# Patient Record
Sex: MALE | Race: WHITE | Employment: OTHER | URBAN - METROPOLITAN AREA
[De-identification: names, ages, dates, MRNs, and addresses within clinical notes are randomized per-mention and may not be internally consistent; named-entity substitution may affect disease eponyms.]

---

## 2017-01-01 ENCOUNTER — CARE COORDINATION (OUTPATIENT)
Dept: CARE COORDINATION | Facility: CLINIC | Age: 72
End: 2017-01-01

## 2017-01-01 ENCOUNTER — OFFICE VISIT (OUTPATIENT)
Dept: FAMILY MEDICINE | Facility: CLINIC | Age: 72
End: 2017-01-01
Payer: COMMERCIAL

## 2017-01-01 ENCOUNTER — MEDICAL CORRESPONDENCE (OUTPATIENT)
Dept: HEALTH INFORMATION MANAGEMENT | Facility: CLINIC | Age: 72
End: 2017-01-01

## 2017-01-01 ENCOUNTER — TELEPHONE (OUTPATIENT)
Dept: FAMILY MEDICINE | Facility: CLINIC | Age: 72
End: 2017-01-01

## 2017-01-01 ENCOUNTER — DOCUMENTATION ONLY (OUTPATIENT)
Dept: FAMILY MEDICINE | Facility: CLINIC | Age: 72
End: 2017-01-01

## 2017-01-01 ENCOUNTER — DOCUMENTATION ONLY (OUTPATIENT)
Dept: CARE COORDINATION | Facility: CLINIC | Age: 72
End: 2017-01-01

## 2017-01-01 ENCOUNTER — HOSPITAL ENCOUNTER (EMERGENCY)
Facility: CLINIC | Age: 72
End: 2017-01-01
Payer: MEDICARE

## 2017-01-01 ENCOUNTER — HOSPITAL ENCOUNTER (EMERGENCY)
Facility: CLINIC | Age: 72
Discharge: NURSING, REHAB OR LONG-TERM CARE | End: 2017-01-31
Attending: FAMILY MEDICINE | Admitting: FAMILY MEDICINE
Payer: MEDICARE

## 2017-01-01 ENCOUNTER — APPOINTMENT (OUTPATIENT)
Dept: CT IMAGING | Facility: CLINIC | Age: 72
End: 2017-01-01
Attending: FAMILY MEDICINE
Payer: MEDICARE

## 2017-01-01 VITALS
OXYGEN SATURATION: 96 % | HEART RATE: 71 BPM | RESPIRATION RATE: 20 BRPM | DIASTOLIC BLOOD PRESSURE: 77 MMHG | SYSTOLIC BLOOD PRESSURE: 176 MMHG | TEMPERATURE: 98.9 F

## 2017-01-01 VITALS
HEIGHT: 70 IN | TEMPERATURE: 96.5 F | OXYGEN SATURATION: 98 % | BODY MASS INDEX: 32.35 KG/M2 | HEART RATE: 59 BPM | RESPIRATION RATE: 18 BRPM | SYSTOLIC BLOOD PRESSURE: 132 MMHG | WEIGHT: 226 LBS | DIASTOLIC BLOOD PRESSURE: 70 MMHG

## 2017-01-01 VITALS
DIASTOLIC BLOOD PRESSURE: 72 MMHG | TEMPERATURE: 97.2 F | WEIGHT: 217 LBS | SYSTOLIC BLOOD PRESSURE: 144 MMHG | HEIGHT: 70 IN | BODY MASS INDEX: 31.07 KG/M2 | HEART RATE: 65 BPM | OXYGEN SATURATION: 96 % | RESPIRATION RATE: 18 BRPM

## 2017-01-01 DIAGNOSIS — H10.33 ACUTE CONJUNCTIVITIS OF BOTH EYES, UNSPECIFIED ACUTE CONJUNCTIVITIS TYPE: Primary | ICD-10-CM

## 2017-01-01 DIAGNOSIS — E11.3419 TYPE 2 DIABETES MELLITUS WITH SEVERE NONPROLIFERATIVE RETINOPATHY AND MACULAR EDEMA, WITHOUT LONG-TERM CURRENT USE OF INSULIN, UNSPECIFIED LATERALITY (H): ICD-10-CM

## 2017-01-01 DIAGNOSIS — E78.5 HYPERLIPIDEMIA LDL GOAL <100: ICD-10-CM

## 2017-01-01 DIAGNOSIS — F32.A DEPRESSION, UNSPECIFIED DEPRESSION TYPE: ICD-10-CM

## 2017-01-01 DIAGNOSIS — F03.91 DEMENTIA WITH BEHAVIORAL DISTURBANCE, UNSPECIFIED DEMENTIA TYPE: ICD-10-CM

## 2017-01-01 DIAGNOSIS — R41.0 DELIRIUM: Primary | ICD-10-CM

## 2017-01-01 DIAGNOSIS — F41.9 ANXIETY: Primary | ICD-10-CM

## 2017-01-01 DIAGNOSIS — E78.5 HYPERLIPIDEMIA LDL GOAL <100: Primary | ICD-10-CM

## 2017-01-01 DIAGNOSIS — R74.8 ELEVATED LIPASE: ICD-10-CM

## 2017-01-01 DIAGNOSIS — R21 RASH: ICD-10-CM

## 2017-01-01 DIAGNOSIS — F02.80 ALZHEIMER'S DEMENTIA (H): Primary | ICD-10-CM

## 2017-01-01 DIAGNOSIS — G30.9 ALZHEIMER'S DEMENTIA (H): Primary | ICD-10-CM

## 2017-01-01 DIAGNOSIS — Z86.73 H/O: STROKE: ICD-10-CM

## 2017-01-01 DIAGNOSIS — E11.3493 TYPE 2 DIABETES MELLITUS WITH SEVERE NONPROLIFERATIVE RETINOPATHY OF BOTH EYES, WITHOUT LONG-TERM CURRENT USE OF INSULIN, MACULAR EDEMA PRESENCE UNSPECIFIED (H): Primary | ICD-10-CM

## 2017-01-01 DIAGNOSIS — F03.90 DEMENTIA (H): ICD-10-CM

## 2017-01-01 LAB
ALBUMIN SERPL-MCNC: 3.9 G/DL (ref 3.4–5)
ALBUMIN UR-MCNC: 10 MG/DL
ALBUMIN UR-MCNC: NEGATIVE MG/DL
ALP SERPL-CCNC: 77 U/L (ref 40–150)
ALT SERPL W P-5'-P-CCNC: 23 U/L (ref 0–70)
ANION GAP SERPL CALCULATED.3IONS-SCNC: 8 MMOL/L (ref 3–14)
APPEARANCE UR: CLEAR
APPEARANCE UR: CLEAR
APTT PPP: 34 SEC (ref 22–37)
AST SERPL W P-5'-P-CCNC: 16 U/L (ref 0–45)
BASOPHILS # BLD AUTO: 0 10E9/L (ref 0–0.2)
BASOPHILS NFR BLD AUTO: 0.6 %
BILIRUB SERPL-MCNC: 0.5 MG/DL (ref 0.2–1.3)
BILIRUB UR QL STRIP: NEGATIVE
BILIRUB UR QL STRIP: NEGATIVE
BUN SERPL-MCNC: 20 MG/DL (ref 7–30)
CALCIUM SERPL-MCNC: 8.9 MG/DL (ref 8.5–10.1)
CHLORIDE SERPL-SCNC: 106 MMOL/L (ref 94–109)
CO2 SERPL-SCNC: 29 MMOL/L (ref 20–32)
COLOR UR AUTO: YELLOW
COLOR UR AUTO: YELLOW
CREAT SERPL-MCNC: 0.92 MG/DL (ref 0.66–1.25)
CREAT UR-MCNC: 122 MG/DL
DIFFERENTIAL METHOD BLD: ABNORMAL
EOSINOPHIL # BLD AUTO: 0.2 10E9/L (ref 0–0.7)
EOSINOPHIL NFR BLD AUTO: 3.3 %
ERYTHROCYTE [DISTWIDTH] IN BLOOD BY AUTOMATED COUNT: 12.7 % (ref 10–15)
GFR SERPL CREATININE-BSD FRML MDRD: 80 ML/MIN/1.7M2
GLUCOSE SERPL-MCNC: 150 MG/DL (ref 70–99)
GLUCOSE SERPL-MCNC: 94 MG/DL (ref 70–99)
GLUCOSE UR STRIP-MCNC: 500 MG/DL
GLUCOSE UR STRIP-MCNC: NEGATIVE MG/DL
HCT VFR BLD AUTO: 34.9 % (ref 40–53)
HGB BLD-MCNC: 12 G/DL (ref 13.3–17.7)
HGB UR QL STRIP: NEGATIVE
HGB UR QL STRIP: NEGATIVE
HYALINE CASTS #/AREA URNS LPF: 1 /LPF (ref 0–2)
IMM GRANULOCYTES # BLD: 0 10E9/L (ref 0–0.4)
IMM GRANULOCYTES NFR BLD: 0.3 %
INR PPP: 1.28 (ref 0.86–1.14)
KETONES UR STRIP-MCNC: 5 MG/DL
KETONES UR STRIP-MCNC: NEGATIVE MG/DL
LEUKOCYTE ESTERASE UR QL STRIP: NEGATIVE
LEUKOCYTE ESTERASE UR QL STRIP: NEGATIVE
LIPASE SERPL-CCNC: 889 U/L (ref 73–393)
LYMPHOCYTES # BLD AUTO: 1.1 10E9/L (ref 0.8–5.3)
LYMPHOCYTES NFR BLD AUTO: 17.6 %
MCH RBC QN AUTO: 30.1 PG (ref 26.5–33)
MCHC RBC AUTO-ENTMCNC: 34.4 G/DL (ref 31.5–36.5)
MCV RBC AUTO: 88 FL (ref 78–100)
MICROALBUMIN UR-MCNC: 49 MG/L
MICROALBUMIN/CREAT UR: 40.25 MG/G CR (ref 0–17)
MONOCYTES # BLD AUTO: 0.6 10E9/L (ref 0–1.3)
MONOCYTES NFR BLD AUTO: 9.3 %
MUCOUS THREADS #/AREA URNS LPF: PRESENT /LPF
NEUTROPHILS # BLD AUTO: 4.4 10E9/L (ref 1.6–8.3)
NEUTROPHILS NFR BLD AUTO: 68.9 %
NITRATE UR QL: NEGATIVE
NITRATE UR QL: NEGATIVE
NON-SQ EPI CELLS #/AREA URNS LPF: ABNORMAL /LPF
PH UR STRIP: 5.5 PH (ref 5–7)
PH UR STRIP: 5.5 PH (ref 5–7)
PLATELET # BLD AUTO: 158 10E9/L (ref 150–450)
POTASSIUM SERPL-SCNC: 4.5 MMOL/L (ref 3.4–5.3)
PROT SERPL-MCNC: 6.3 G/DL (ref 6.8–8.8)
RBC # BLD AUTO: 3.99 10E12/L (ref 4.4–5.9)
RBC #/AREA URNS AUTO: 1 /HPF (ref 0–2)
RBC #/AREA URNS AUTO: ABNORMAL /HPF (ref 0–2)
SODIUM SERPL-SCNC: 143 MMOL/L (ref 133–144)
SP GR UR STRIP: 1.02 (ref 1–1.03)
SP GR UR STRIP: 1.02 (ref 1–1.03)
SQUAMOUS #/AREA URNS AUTO: 1 /HPF (ref 0–1)
TSH SERPL DL<=0.005 MIU/L-ACNC: 0.89 MU/L (ref 0.4–4)
URN SPEC COLLECT METH UR: ABNORMAL
URN SPEC COLLECT METH UR: ABNORMAL
UROBILINOGEN UR STRIP-ACNC: 2 EU/DL (ref 0.2–1)
UROBILINOGEN UR STRIP-MCNC: 2 MG/DL (ref 0–2)
WBC # BLD AUTO: 6.4 10E9/L (ref 4–11)
WBC #/AREA URNS AUTO: 1 /HPF (ref 0–2)
WBC #/AREA URNS AUTO: ABNORMAL /HPF (ref 0–2)

## 2017-01-01 PROCEDURE — 85730 THROMBOPLASTIN TIME PARTIAL: CPT | Performed by: FAMILY MEDICINE

## 2017-01-01 PROCEDURE — 93005 ELECTROCARDIOGRAM TRACING: CPT

## 2017-01-01 PROCEDURE — 81001 URINALYSIS AUTO W/SCOPE: CPT | Performed by: FAMILY MEDICINE

## 2017-01-01 PROCEDURE — 25000125 ZZHC RX 250: Performed by: FAMILY MEDICINE

## 2017-01-01 PROCEDURE — 70450 CT HEAD/BRAIN W/O DYE: CPT

## 2017-01-01 PROCEDURE — 82947 ASSAY GLUCOSE BLOOD QUANT: CPT | Performed by: FAMILY MEDICINE

## 2017-01-01 PROCEDURE — 80053 COMPREHEN METABOLIC PANEL: CPT | Performed by: FAMILY MEDICINE

## 2017-01-01 PROCEDURE — 99285 EMERGENCY DEPT VISIT HI MDM: CPT | Mod: 25 | Performed by: FAMILY MEDICINE

## 2017-01-01 PROCEDURE — 36415 COLL VENOUS BLD VENIPUNCTURE: CPT | Performed by: FAMILY MEDICINE

## 2017-01-01 PROCEDURE — 25000132 ZZH RX MED GY IP 250 OP 250 PS 637: Mod: GY | Performed by: FAMILY MEDICINE

## 2017-01-01 PROCEDURE — 82043 UR ALBUMIN QUANTITATIVE: CPT | Performed by: FAMILY MEDICINE

## 2017-01-01 PROCEDURE — 93010 ELECTROCARDIOGRAM REPORT: CPT | Performed by: FAMILY MEDICINE

## 2017-01-01 PROCEDURE — 84443 ASSAY THYROID STIM HORMONE: CPT | Performed by: FAMILY MEDICINE

## 2017-01-01 PROCEDURE — 85610 PROTHROMBIN TIME: CPT | Performed by: FAMILY MEDICINE

## 2017-01-01 PROCEDURE — 85025 COMPLETE CBC W/AUTO DIFF WBC: CPT | Performed by: FAMILY MEDICINE

## 2017-01-01 PROCEDURE — 99214 OFFICE O/P EST MOD 30 MIN: CPT | Performed by: FAMILY MEDICINE

## 2017-01-01 PROCEDURE — 99285 EMERGENCY DEPT VISIT HI MDM: CPT | Mod: 25

## 2017-01-01 PROCEDURE — 96372 THER/PROPH/DIAG INJ SC/IM: CPT

## 2017-01-01 PROCEDURE — 99213 OFFICE O/P EST LOW 20 MIN: CPT | Performed by: FAMILY MEDICINE

## 2017-01-01 PROCEDURE — 83690 ASSAY OF LIPASE: CPT | Performed by: FAMILY MEDICINE

## 2017-01-01 PROCEDURE — A9270 NON-COVERED ITEM OR SERVICE: HCPCS | Mod: GY | Performed by: FAMILY MEDICINE

## 2017-01-01 RX ORDER — ATORVASTATIN CALCIUM 40 MG/1
40 TABLET, FILM COATED ORAL DAILY
Qty: 90 TABLET | Refills: 0 | Status: SHIPPED | OUTPATIENT
Start: 2017-01-01

## 2017-01-01 RX ORDER — RIVASTIGMINE TARTRATE 3 MG/1
3 CAPSULE ORAL 2 TIMES DAILY
Qty: 60 CAPSULE | Refills: 1 | Status: SHIPPED | OUTPATIENT
Start: 2017-01-01

## 2017-01-01 RX ORDER — OLANZAPINE 10 MG/2ML
5 INJECTION, POWDER, FOR SOLUTION INTRAMUSCULAR DAILY PRN
Status: DISCONTINUED | OUTPATIENT
Start: 2017-01-01 | End: 2017-01-01 | Stop reason: HOSPADM

## 2017-01-01 RX ORDER — MUPIROCIN 20 MG/G
OINTMENT TOPICAL 3 TIMES DAILY
Qty: 22 G | Refills: 1 | Status: SHIPPED | OUTPATIENT
Start: 2017-01-01 | End: 2017-01-01 | Stop reason: ALTCHOICE

## 2017-01-01 RX ORDER — MUPIROCIN 20 MG/G
OINTMENT TOPICAL 3 TIMES DAILY
Qty: 30 G | Refills: 1 | Status: SHIPPED | OUTPATIENT
Start: 2017-01-01 | End: 2017-01-01

## 2017-01-01 RX ORDER — HALOPERIDOL 1 MG/1
1 TABLET ORAL 3 TIMES DAILY PRN
Qty: 60 TABLET | Refills: 0 | Status: SHIPPED | OUTPATIENT
Start: 2017-01-01

## 2017-01-01 RX ORDER — HALOPERIDOL 5 MG/1
5 TABLET ORAL EVERY 6 HOURS PRN
Status: DISCONTINUED | OUTPATIENT
Start: 2017-01-01 | End: 2017-01-01 | Stop reason: HOSPADM

## 2017-01-01 RX ADMIN — HALOPERIDOL 5 MG: 5 TABLET ORAL at 19:59

## 2017-01-01 RX ADMIN — OLANZAPINE 5 MG: 10 INJECTION, POWDER, FOR SOLUTION INTRAMUSCULAR at 20:52

## 2017-01-01 ASSESSMENT — ENCOUNTER SYMPTOMS
RESPIRATORY NEGATIVE: 1
ENDOCRINE NEGATIVE: 1
GASTROINTESTINAL NEGATIVE: 1
HEMATOLOGIC/LYMPHATIC NEGATIVE: 1
EYES NEGATIVE: 1
PSYCHIATRIC NEGATIVE: 1
ALLERGIC/IMMUNOLOGIC NEGATIVE: 1
NEUROLOGICAL NEGATIVE: 1
CONSTITUTIONAL NEGATIVE: 1
MUSCULOSKELETAL NEGATIVE: 1
CARDIOVASCULAR NEGATIVE: 1
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
## 2017-01-03 NOTE — PROGRESS NOTES
Clinic Care Coordination Contact   Patient's spouse left a message on RN CC VM stating she is needing some advice regarding patient so she is requesting a call back.     RN CC to call patient back.           Cari Rasheed RN, [facility]   RN Care Coordinator   [facility]   Phone # [phone]   1/3/2017 9:12 AM

## 2017-01-03 NOTE — PROGRESS NOTES
Clinic Care Coordination Contact   Care Team Conversations   RN CC spoke with Shi CAPPS regarding the case. She suggested getting patient in for an appointment for possible medication adjustments and maybe something at night for helping him sleep and rule out any type of infection. She also suggested getting a Long Term Care Consultation thru the county to see what services they would qualify for. She said another option as well is to get home care to come out for nursing assessment for medication changes and undo stress of removal out of home. She also suggested making sure all guns and weapons are removed from the home.   Cari Rasheed RN, [facility]   RN Care Coordinator   [facility]   Phone # [phone]   1/3/2017 10:25 AM

## 2017-01-03 NOTE — PROGRESS NOTES
Clinic Care Coordination Contact  Care Team Conversations    RN CC spoke with patient's wife regarding the plan, she is ok with this plan. RN CC assisted patient's wife in making an appointment with PCP for patient on Thursday 1/5/17 @ 1pm to rule out infection and medication adjustments. DILIP CAPPS also will call [facility] for Long Term Care Consultation and have them set up appointment with patient's spouse via her cell phone.    If at OV with PCP on Thursday, we can always order home care for new medication and medication adjustments and he is home bound due to great difficulty getting him out of the house due to his paranoia.    Cari Rasheed RN, [facility]  RN Care Coordinator  [facility]  Phone # [phone]  1/3/2017 10:39 AM

## 2017-01-04 NOTE — TELEPHONE ENCOUNTER
Atorvastatin         Last Written Prescription Date: 10/28/15  Last Fill Quantity: 90, # refills: 3    Last Office Visit with [facility], [facility] or [facility] prescribing provider:  11/30/16   Future Office Visit:  0  Next 5 appointments (look out 90 days)     Jan 05, 2017 10:20 AM   SHORT with Ramakrishna Elizondo MD   [facility]    [address] [place] [id number]   [phone]                  BP Readings from Last 3 Encounters:   11/30/16 138/88   06/15/16 120/64   05/05/16 134/66     ALT       30   4/28/2014  CHOL      157   10/28/2015  HDL       38   10/28/2015  LDL       85   10/28/2015  LDL      105   9/8/2014  TRIG      168   10/28/2015  CHOLHDLRATIO      4.1   10/28/2015

## 2017-01-04 NOTE — PROGRESS NOTES
Clinic Care Coordination Contact  Care Team Conversations    Patient's spouse Pat called RN CC stating she has rescheduled patient's appointment for 10:30am tomorrow with Dr. Elizondo for a second opinion and daughter, spouse will be there. She also hopes that RN CC will attend. RN CC informed her she will be there and give  an heads up as well. She appreciated this.    Cari Rasheed RN, [facility]  RN Care Coordinator  [facility]  Phone # [phone]  1/4/2017 9:32 AM

## 2017-01-05 NOTE — PROGRESS NOTES
Clinic Care Coordination Contact  OUTREACH    Referral Information:  Referral Source: PCP  Reason for Contact: Face to face visit with Dr. Elizondo today to address behavioral issues. Patient saw Dr. Elizondo with son. While RN CC met with spouse Judi and daughter Elizabeth. They arrived late today due to meeting with Stephanie Matias with [facility] for respite, companionship and filling out health care directives. RN CC made copies of the healthcare directives for chart. Daughter Elizabeth informed RN GÉNESIS that she has put her dad on the waiting list for [facility]. Pat states its so expensive, RN CC explained to her that is why we are doing the long term care consultation to help sort out his and her assets so he can get the help he needs. We discussed at length that should things get bad where he is dangerous or she feels unsafe then she needs to call 911. We discussed when its appropriate to bring in hospice as well and will keep it in the back of our minds.     DILIP CAPPS did have Dr. Elizondo come in to talk with wife Pat and daughter Elizabeth to discuss their concerns separate from patient as patient was seen with son. They talked about Pat recently starting patient on the Rivastigmine about a week ago then two days ago started him on the 3mg and states she has seen a difference, he agreed to keep him on this 3mg for a month and see what happens. We talked about making the neurology consult as well and having a UA with Microscopic done, she was given a cup and instructions to bring it back when done. They also are to make a one month follow up with Dr. Elizondo which was done for 2/2/17. We also discussed in detail that patient is more in the moderate to severe part of the disease and that he is going to progress in which he will need 24/7 care in more a facility setting, both Elizabeth and Judi verbalized understanding of this.        Universal Utilization:   Last PCP appointment: 01/05/17             Clinical  Concerns:  Current Medical Concerns: See above    Current Behavioral Concerns: see above    Education Provided to patient: see above   Clinical Pathway Name: None  Clinical Pathway: None    Medication Management:  Wife Pat administers the medications.     Functional Status:  Mobility Status: Independent  Equipment Currently Used at Home: none  Transportation: family           Psychosocial:  Current living arrangement:: I live in a private home with spouse  Financial/Insurance: Medicare/BCBS  The plan is to work on doing some spend down with their funding like new furnace, new carpet.     Resources and Interventions:  Current Resources: n/a        Advanced Care Plans/Directives on file:: Yes  Referrals Placed: [facility]     Goals:   n/a              Barriers: Progressive dementia  Strengths: great family support and willingness to work with care coordination  Patient/Caregiver understanding: yes  Frequency of Care Coordination: PRN  Upcoming appointment: 02/02/17     Plan:   Patient will continue to follow treatment plan as directed and follow up with PCP with concerns ongoing.   Pat to schedule neurology consult.  Pat to schedule f/u with Dr. Elizondo.  Pat to get urine sample for testing.  RN CC to remain available.    Cari Rasheed RN, [facility]  RN Care Coordinator  [facility]  Phone # [phone]  1/5/2017 2:06 PM

## 2017-01-05 NOTE — NURSING NOTE
"Chief Complaint   Patient presents with     Dementia     Derm Problem       Initial /72 mmHg  Pulse 65  Temp(Src) 97.2  F (36.2  C) (Tympanic)  Resp 18  Ht 5' 10.25\" (1.784 m)  Wt 217 lb (98.431 kg)  BMI 30.93 kg/m2  SpO2 96% Estimated body mass index is 30.93 kg/(m^2) as calculated from the following:    Height as of this encounter: 5' 10.25\" (1.784 m).    Weight as of this encounter: 217 lb (98.431 kg).  BP completed using cuff size: regular  "

## 2017-01-05 NOTE — PATIENT INSTRUCTIONS
Would recommend minimal pharmacological intervention.  Suggested regular walk, read books and activities which you enjoy.   Avoid triggers which may precipitate delirium.   Mupirocin ordered for rash.   Follow up with neurology as advised in the past.

## 2017-01-05 NOTE — PROGRESS NOTES
SUBJECTIVE:                                                    Maik Case is a 71 year old male who presents to clinic today for the following health issues:      Dementia       Duration: Ongoing- getting a lot worse lately.  Lots of hallucinations, delusions    Description (location/character/radiation): Getting mad about a drivers license and not being able to drive.     Intensity:  moderate    Accompanying signs and symptoms: Sores on his face for about a month. - mostly on the left side-has one on the right. Not itchy- using neosporin.     History (similar episodes/previous evaluation): Thought it was medication reaction so switched meds and the sores are still there.     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 He is having gradual decline in memory for several years which has significantly worsened in the recent past. He was started on aricept but stopped secondary to facial rash and was switched to rivastigmine. Wife started giving him rivastigmine few days ago only (son mentioned previously that was stared about 1 month ago). No other relevant systemic symptoms. MRI brain in 2014 showed diffuse cerebral volume loss and cerebral white matter changes consistent with chronic small vessel ischemic disease.      Problem list and histories reviewed & adjusted, as indicated.  Additional history: as documented    Patient Active Problem List   Diagnosis     Hypertension goal BP (blood pressure) < 140/90     Impotence of organic origin     Obesity     bilateral knee djd     GERD (gastroesophageal reflux disease)     Diabetic macular edema (H)     Hyperlipidemia LDL goal <100     Type 2 diabetes mellitus with severe nonproliferative  retinopathy and macular edema     Benign localized hyperplasia of prostate with urinary obstruction and other lower urinary tract symptoms (LUTS)(600.21)     Status post lumbar surgery     H/O: CVA (cerebrovascular accident)     Lupus anticoagulant positive     BPV (benign positional vertigo)     Dementia without behavioral disturbance, unspecified dementia type     Anxiety     Past Surgical History   Procedure Laterality Date     Surgical history of -        s/p (R) Knee     Surgical history of -        s/p Hernia     Surgical history of -        s/p Tonsillectomy as a child     Surgical history of -   2003     s/p (R) Knee       Social History   Substance Use Topics     Smoking status: Former Smoker     Quit date: 1980     Smokeless tobacco: Never Used     Alcohol Use: Yes      Comment: yearly one beer four times a year     Family History   Problem Relation Age of Onset     Alzheimer Disease Mother      on med     C.A.D. Father       unknown, last seen him age 18     Eye Disorder Brother      Retinitis pigmentosis, cataracts     Hypertension Daughter          Current Outpatient Prescriptions   Medication Sig Dispense Refill     mupirocin (BACTROBAN) 2 % ointment Apply topically 3 times daily for 7 days 30 g 1     atorvastatin (LIPITOR) 40 MG tablet Take 1 tablet (40 mg) by mouth daily 90 tablet 0     metFORMIN (GLUCOPHAGE) 1000 MG tablet Take 1 tablet (1,000 mg) by mouth 2 times daily (with meals) 180 tablet 3     lisinopril (PRINIVIL,ZESTRIL) 20 MG tablet 20mg tab bid 180 tablet 1     sertraline (ZOLOFT) 100 MG tablet Take 1 tablet (100 mg) by mouth daily 90 tablet 3     calcium carbonate (OS- MG [place]. CA) 500 MG tablet Take 500 mg by mouth 2 times daily       Cholecalciferol (VITAMIN D3 PO) Take 2,000 Units by mouth daily       Naproxen Sodium (ALEVE PO)        aspirin 325 MG tablet Take 325 mg by mouth every evening       glucose blood VI test strips (ACCU-CHEK COMPACT DRUM TEST STRIPS)  strip Test as directed 3 times daily 3 Box 3     ORDER FOR DME Equipment being ordered: one pair of diabetic shoes 1 Device 0     Cyanocobalamin (VITAMIN B 12 PO) Take 500 mcg by mouth daily        Ketorolac Tromethamine, [id number], (KETOROLAC TROMETHAMINE OP) Place 1 drop Into the left eye 4 times daily.       UNKNOWN MED DOSAGE Steroid Shot in eye every 6 - 8 weeks (Dr. Gould)       MULTIVITAMIN OR take one tablet by mouth daily       GLUCOSAMINE CHONDROITIN OR TABS 1 tablet twice daily - pt states there are other vitamins/minerals in tablet       Allergies   Allergen Reactions     Aricept [Donepezil] Blisters     On face     Penicillins Swelling     Percocet [Oxycodone-Acetaminophen] Itching     Vicodin [Hydrocodone-Acetaminophen] Itching     Recent Labs   Lab Test  11/30/16   1543  05/05/16   0924  10/28/15   0957  09/08/14   0845  04/28/14   1145   06/26/13   0850   06/20/13   1044  11/14/12   0840   06/14/11   0905   A1C  6.5*  7.0*  8.1*  6.5*   --    < >   --    --   7.5*  6.6*   < >   --    LDL   --    --   85  105   --    --    --    --   115  106   --   73   HDL   --    --   38*   --    --    --    --    --    --   37*   --   42   TRIG   --    --   168*   --    --    --    --    --    --   61   --   45   ALT   --    --    --    --   30   --   30   --    --   31   --   17   CR  0.87  0.91   --    --   0.77   < >  0.88   < >   --   1.05   < >   --    GFRESTIMATED  86  81   --    --   >90   < >  86   < >   --   70   < >   --    GFRESTBLACK  >90   GFR Calc    >90   GFR Calc     --    --   >90   < >  >90   < >   --   85   < >   --    POTASSIUM  4.5  4.8   --    --   4.3   < >  4.5   < >   --   5.3   < >   --    TSH   --    --   1.45   --    --    --    --    --   1.80   --    --    --     < > = values in this interval not displayed.      BP Readings from Last 3 Encounters:   01/05/17 144/72   11/30/16 138/88   06/15/16 120/64    Wt Readings from Last 3 Encounters:   01/05/17  "217 lb (98.431 kg)   11/30/16 217 lb (98.431 kg)   06/15/16 224 lb (101.606 kg)                  Problem list, Medication list, Allergies, and Medical/Social/Surgical histories reviewed in [facility] and updated as appropriate.    ROS:  Constitutional, HEENT, cardiovascular, pulmonary, gi and gu systems are negative, except as otherwise noted.    OBJECTIVE:                                                    /72 mmHg  Pulse 65  Temp(Src) 97.2  F (36.2  C) (Tympanic)  Resp 18  Ht 5' 10.25\" (1.784 m)  Wt 217 lb (98.431 kg)  BMI 30.93 kg/m2  SpO2 96%  Body mass index is 30.93 kg/(m^2).  GENERAL: alert and no distress  EYES: Eyes grossly normal to inspection, PERRL and conjunctivae and sclerae normal  HENT: ear canals and TM's normal, nose and mouth without ulcers or lesions  NECK: no adenopathy, no asymmetry, masses, or scars and thyroid normal to palpation  RESP: lungs clear to auscultation - no rales, rhonchi or wheezes  CV: regular rate and rhythm, normal S1 S2, no S3 or S4, no murmur, click or rub, no peripheral edema and peripheral pulses strong  ABDOMEN: soft, nontender, no hepatosplenomegaly, no masses and bowel sounds normal  MS: no gross musculoskeletal defects noted, no edema  SKIN: erythematous papular lesions involving face along with some excoriation and honey colored crusting, no blistering or drainage noted  NEURO: Normal strength and tone, sensory exam grossly normal, short-term memory loss, pretty obvious, repetitive questioning, good eye contact, poor insight and judgment, no focal neurology     ASSESSMENT/PLAN:                                                          ICD-10-CM    1. Dementia with behavioral disturbance, unspecified dementia type F03.91    2. Rash R21 mupirocin (BACTROBAN) 2 % ointment     DISCONTINUED: mupirocin (BACTROBAN) 2 % ointment   3. Type 2 diabetes mellitus with severe nonproliferative retinopathy and macular edema, without long-term current use of insulin, unspecified " laterality E11.3419    4. H/O: stroke Z86.73    5. Depression, unspecified depression type F32.9        71 year-old male brought in by family for evaluation of facial rash and declining memory along with delusions and hallucinations. Symptoms are likely secondary to dementia (alzheimer vs vascular). Started taking rivastigmine few days ago. Facial rash likely due to continuous picking and impetigo. Mupirocin ointment prescribed. I explained family that unfortunately dementia is progressive disease with no cure along and with limited treatment options. Suggested to continue rivastigmine 3 mg. Suggested to avoid triggering factors like constipation, pain, minimizing unfamiliar situations/surroundings etc. Suspect symptoms related to delirium on the background of dementia. UA ordered to rule out UTI. Neurology consult placed for further review and recommendation, may consider CT had to rule out epidural or subdural bleed. Other medication reviewed and no changes made. Health care coordinator on board, following community referral and medical care. Follow up one month or earlier if needed. All questions answered.      Ramakrishna Elizondo MD  [facility]

## 2017-01-05 NOTE — MR AVS SNAPSHOT
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  Would recommend minimal pharmacological intervention.  Suggested regular walk, read books and activities which you enjoy.   Avoid triggers which may precipitate delirium.   Mupirocin ordered for rash.   Follow up with neurology as advised in the past.            Follow-ups after your visit        Future tests that were ordered for you today     Open Future Orders        Priority Expected Expires Ordered    Lipid Profile with reflex to direct LDL Routine  1/4/2018 1/4/2017    Albumin Random Urine Quantitative Routine  1/4/2018 1/4/2017            Who to contact     If you have questions or need follow up information about today's clinic visit or your schedule please contact [facility] directly at [phone].  Normal or non-critical lab and imaging results will be communicated to you by MyChart, letter or phone within 4 business days after the clinic has received the results. If you do not hear from us within 7 days, please contact the clinic through MyChart or phone. If you have a critical or abnormal lab result, we will notify you by phone as soon as possible.  Submit refill requests through [facility] or call your pharmacy and they will forward the refill request to us. Please allow 3 business days for  "your refill to be completed.          Additional Information About Your Visit        [employer] Information     [employer] gives you secure access to your electronic health record. If you see a primary care provider, you can also send messages to your care team and make appointments. If you have questions, please call your primary care clinic.  If you do not have a primary care provider, please call [phone] and they will assist you.        Care EveryWhere ID     This is your Care EveryWhere ID. This could be used by other organizations to access your [place] medical records  [id number]        Your Vitals Were     Pulse Temperature Respirations Height BMI (Body Mass Index) Pulse Oximetry    65 97.2  F (36.2  C) (Tympanic) 18 5' 10.25\" (1.784 m) 30.93 kg/m2 96%       Blood Pressure from Last 3 Encounters:   01/05/17 144/72   11/30/16 138/88   06/15/16 120/64    Weight from Last 3 Encounters:   01/05/17 217 lb (98.431 kg)   11/30/16 217 lb (98.431 kg)   06/15/16 224 lb (101.606 kg)              Today, you had the following     No orders found for display         Today's Medication Changes          These changes are accurate as of: 1/5/17 12:07 PM.  If you have any questions, ask your nurse or doctor.               Start taking these medicines.        Dose/Directions    mupirocin 2 % ointment   Commonly known as:  BACTROBAN   Used for:  Rash   Started by:  Ramakrishna Elizondo MD        Apply topically 3 times daily for 7 days   Quantity:  30 g   Refills:  1         Stop taking these medicines if you haven't already. Please contact your care team if you have questions.     rivastigmine 1.5 MG capsule   Commonly known as:  EXELON   Stopped by:  Ramakrishna Elizondo MD                Where to get your medicines      These medications were sent to [facility] [address] [place] [address]  [address] [facility] [address]     Phone:  [phone]    - mupirocin 2 % ointment             Primary Care Provider " Office Phone # Fax #    Behzad Mullins [phone]       [facility] [address] [facility] [address]        Thank you!     Thank you for choosing [facility]  for your care. Our goal is always to provide you with excellent care. Hearing back from our patients is one way we can continue to improve our services. Please take a few minutes to complete the written survey that you may receive in the mail after your visit with us. Thank you!             Your Updated Medication List - Protect others around you: Learn how to safely use, store and throw away your medicines at www.disposemymeds.org.          This list is accurate as of: 1/5/17 12:07 PM.  Always use your most recent med list.                   Brand Name Dispense Instructions for use    ALEVE PO          aspirin 325 MG tablet      Take 325 mg by mouth every evening       atorvastatin 40 MG tablet    LIPITOR    90 tablet    Take 1 tablet (40 mg) by mouth daily       blood glucose monitoring test strip    ACCU-CHEK COMPACT DRUM    3 Box    Test as directed 3 times daily       calcium carbonate 500 MG tablet    OS- mg [place]. [place]     Take 500 mg by mouth 2 times daily       GLUCOSAMINE CHONDROITIN Tabs      1 tablet twice daily - pt states there are other vitamins/minerals in tablet       KETOROLAC TROMETHAMINE OP      Place 1 drop Into the left eye 4 times daily.       lisinopril 20 MG tablet    PRINIVIL/ZESTRIL    180 tablet    20mg tab bid       metFORMIN 1000 MG tablet    GLUCOPHAGE    180 tablet    Take 1 tablet (1,000 mg) by mouth 2 times daily (with meals)       MULTIVITAMIN PO      take one tablet by mouth daily       mupirocin 2 % ointment    BACTROBAN    30 g    Apply topically 3 times daily for 7 days       * order for DME     1 Device    Equipment being ordered: one pair of diabetic shoes       sertraline 100 MG tablet    ZOLOFT    90 tablet    Take 1 tablet (100 mg) by mouth daily       *  UNKNOWN MED DOSAGE      Steroid Shot in eye every 6 - 8 weeks (Dr. Gould)       VITAMIN B 12 PO      Take 500 mcg by mouth daily       VITAMIN D3 PO      Take 2,000 Units by mouth daily       * Notice:  This list has 2 medication(s) that are the same as other medications prescribed for you. Read the directions carefully, and ask your doctor or other care provider to review them with you.

## 2017-01-09 PROBLEM — F32.A DEPRESSION, UNSPECIFIED DEPRESSION TYPE: Status: ACTIVE | Noted: 2017-01-01

## 2017-01-20 NOTE — PROGRESS NOTES
Clinic Care Coordination Contact  [facility]/Voicemail    Referral Source: PCP    Clinical Data: Care Coordinator Outreach, update to see how things are going and if going to have blood work.    Outreach attempted x 1 to patient's wife.  No answer, voicemail box full.    Plan: Care Coordinator will try to reach patient again in 1-2 business days.      Cari Rasheed RN, Health system  RN Care Coordinator  [facility], [facility]  Phone # [phone]  1/20/2017 2:06 PM

## 2017-01-20 NOTE — PROGRESS NOTES
Clinic Care Coordination Contact    RN CC spoke with patient's daughter Elizabeth since RN CC was unable to reach spouse. Elizabeth states patient and spouse are staying at her brother's place right now. She states that Ryan Martinez from [facility] came out did his assessment and is working getting her respite care, PCA services, working on things in the home as well. She states patient qualified for Elderly Waiver.    Cari Rasheed RN, [facility]  RN Care Coordinator  [facility], [facility]  Phone # [phone]  1/20/2017 2:36 PM

## 2017-01-20 NOTE — PROGRESS NOTES
Clinic Care Coordination Contact    Patient's spouse left RN CC  stating she is aware of getting the blood work done but states its difficult to get him to come in. She states the day she got the urine sample he had three cups of coffee with about 1/4 cup of regular coffee creamer, she states in the message that usually he has sugar free but they are staying at her son's house right now. She states Ryan Martinez from the [facility] is working on getting them services.    RN CC to call Pat back.    Cari Rasheed RN, [facility]  RN Care Coordinator  [facility]  Phone # [phone]  1/20/2017 2:39 PM

## 2017-01-20 NOTE — PROGRESS NOTES
Clinic Care Coordination Contact    RN CC outreached to patient's spouse Pat, she reiterated the same thing she had said on the voicemail about the coffee and difficulty bringing him in to the clinic. RN CC agreed to ask PCP if its still necessary to get this done as its difficult? She states that Ryan from the [facility] has been great and helping her get services in place before going back home. She also asked RN CC to ask PCP about getting something for him to sleep and mood alterting, she states his mood has gotten bad again even with taking the Excelon 3mg daily. RN CC agreed to ask.    Plan:  PCP- spouse wants to know if she really needs to have patient brought in the clinic for lab as its difficult, she states his blood sugar was high in his urine due to 3 cups of coffee with 1/4 cup of regular creamer.  PCP-spouse also wants to know if patient can have something for sleep and mood alterting due to his behaviors are getting bad again.  RN CC to check in two weeks.    Cari Rasheed RN, [facility]  RN Care Coordinator  [facility], [facility]  Phone # [phone]  1/20/2017 2:56 PM

## 2017-01-23 NOTE — PROGRESS NOTES
SUBJECTIVE:                                                    Maik Case is a 71 year old male who presents to clinic today for the following health issues:      Eye(s) Problem      Duration: couple weeks    Description:  Location: bilateral  Pain: no pain but itchy  Redness: YES  Discharge: YES- not constant but every now and again    Accompanying signs and symptoms: eye lids are red and puffy    History (Trauma, foreign body exposure,): None    Precipitating or alleviating factors (contact use): None    Therapies tried and outcome: eye stain a few times          Problem list and histories reviewed & adjusted, as indicated.  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     Benign localized hyperplasia of prostate with urinary obstruction and other lower urinary tract symptoms (LUTS)(600.21)     Status post lumbar surgery     H/O: CVA (cerebrovascular accident)     Lupus anticoagulant positive     BPV (benign positional vertigo)     Dementia without behavioral disturbance, unspecified dementia type     Anxiety     Depression, unspecified depression type     Past Surgical History   Procedure Laterality Date     Surgical history of -   1981     s/p (R) Knee     Surgical history of -        s/p Hernia     Surgical history of -        s/p Tonsillectomy as a child     Surgical history of -   05/2003     s/p (R) Knee       Social History   Substance Use Topics     Smoking status: Former Smoker     Quit date: 08/03/1980     Smokeless tobacco: Never Used     Alcohol Use: Yes      Comment: yearly one beer four times a year     Family History   Problem Relation Age of Onset      Alzheimer Disease Mother      on med     C.A.D. Father       unknown, last seen him age 18     Eye Disorder Brother      Retinitis pigmentosis, cataracts     Hypertension Daughter          Current Outpatient Prescriptions   Medication Sig Dispense Refill     gentamicin (GARAMYCIN) 0.3 % ophthalmic ointment Place 1 Application into both eyes 3 times daily 3.5 g 0     rivastigmine (EXELON) 3 MG capsule Take 1 capsule (3 mg) by mouth 2 times daily 60 capsule 1     atorvastatin (LIPITOR) 40 MG tablet Take 1 tablet (40 mg) by mouth daily 90 tablet 0     metFORMIN (GLUCOPHAGE) 1000 MG tablet Take 1 tablet (1,000 mg) by mouth 2 times daily (with meals) 180 tablet 3     lisinopril (PRINIVIL,ZESTRIL) 20 MG tablet 20mg tab bid 180 tablet 1     sertraline (ZOLOFT) 100 MG tablet Take 1 tablet (100 mg) by mouth daily 90 tablet 3     calcium carbonate (OS- MG [place]. [place]) 500 MG tablet Take 500 mg by mouth 2 times daily       Cholecalciferol (VITAMIN D3 PO) Take 2,000 Units by mouth daily       Naproxen Sodium (ALEVE PO)        aspirin 325 MG tablet Take 325 mg by mouth every evening       glucose blood VI test strips (ACCU-CHEK COMPACT DRUM TEST STRIPS) strip Test as directed 3 times daily 3 Box 3     ORDER FOR DME Equipment being ordered: one pair of diabetic shoes 1 Device 0     Cyanocobalamin (VITAMIN B 12 PO) Take 500 mcg by mouth daily        UNKNOWN MED DOSAGE Steroid Shot in eye every 6 - 8 weeks (Dr. Gould)       MULTIVITAMIN OR take one tablet by mouth daily       GLUCOSAMINE CHONDROITIN OR TABS 1 tablet twice daily - pt states there are other vitamins/minerals in tablet       Allergies   Allergen Reactions     Aricept [Donepezil] Blisters     On face     Penicillins Swelling     Percocet [Oxycodone-Acetaminophen] Itching     Vicodin [Hydrocodone-Acetaminophen] Itching     Recent Labs   Lab Test  16   1543  16   0924  10/28/15   0957  14   0845  14   1145   13   0850    "06/20/13   1044  11/14/12   0840   06/14/11   0905   A1C  6.5*  7.0*  8.1*  6.5*   --    < >   --    --   7.5*  6.6*   < >   --    LDL   --    --   85  105   --    --    --    --   115  106   --   73   HDL   --    --   38*   --    --    --    --    --    --   37*   --   42   TRIG   --    --   168*   --    --    --    --    --    --   61   --   45   ALT   --    --    --    --   30   --   30   --    --   31   --   17   CR  0.87  0.91   --    --   0.77   < >  0.88   < >   --   1.05   < >   --    GFRESTIMATED  86  81   --    --   >90   < >  86   < >   --   70   < >   --    GFRESTBLACK  >90   GFR Calc    >90   GFR Calc     --    --   >90   < >  >90   < >   --   85   < >   --    POTASSIUM  4.5  4.8   --    --   4.3   < >  4.5   < >   --   5.3   < >   --    TSH   --    --   1.45   --    --    --    --    --   1.80   --    --    --     < > = values in this interval not displayed.      BP Readings from Last 3 Encounters:   01/23/17 132/70   01/05/17 144/72   11/30/16 138/88    Wt Readings from Last 3 Encounters:   01/23/17 226 lb (102.513 kg)   01/05/17 217 lb (98.431 kg)   11/30/16 217 lb (98.431 kg)                  Labs reviewed in EPIC  Problem list, Medication list, Allergies, and Medical/Social/Surgical histories reviewed in [facility] and updated as appropriate.    ROS:  Constitutional, HEENT, cardiovascular, pulmonary, gi and gu systems are negative, except as otherwise noted.    OBJECTIVE:                                                    /70 mmHg  Pulse 59  Temp(Src) 96.5  F (35.8  C) (Tympanic)  Resp 18  Ht 5' 10.25\" (1.784 m)  Wt 226 lb (102.513 kg)  BMI 32.21 kg/m2  SpO2 98%  Body mass index is 32.21 kg/(m^2).  GENERAL: alert and no distress  EYES: Eyelids slightly puffy, some watering noted, subconjunctival left eye, no significant conjunctival injection noted  NECK: no adenopathy, no asymmetry, masses, or scars and thyroid normal to palpation  RESP: lungs clear to " auscultation - no rales, rhonchi or wheezes  CV: regular rate and rhythm, normal S1 S2, no S3 or S4, no murmur, click or rub, no peripheral edema and peripheral pulses strong  NEURO: grossly intact  RASH: facial rashes appears improved looking       ASSESSMENT/PLAN:                                                          ICD-10-CM    1. Acute conjunctivitis of both eyes, unspecified acute conjunctivitis type H10.33 gentamicin (GARAMYCIN) 0.3 % ophthalmic ointment   2. 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 Suggested warm compresses and regular eye washing. Urinary blood glucose was high on last occasion, blood glucose ordered. Neurology appointment is scheduled for March. Wife understood and in agreement with the above plan.      Ramakrishna Elizondo MD  [facility]

## 2017-01-23 NOTE — MR AVS SNAPSHOT
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 This part of the eye is called the conjunctiva. The infection is called conjunctivitis. The most common symptoms of conjunctivitis include a thick, pus-like discharge from the eye, swollen eyelids, redness, eyelids sticking together upon awakening, and a gritty or scratchy feeling in the eye. Your infection was caused by bacteria. It may be treated with medicine. With treatment, the infection takes about 7 to 10 days to resolve.    Home care    Use prescribed antibiotic eye drops or ointment as directed to treat the infection.    Apply a warm compress (towel soaked in warm water) to the affected eye 3 to 4 times a day. Do this just before applying medicine to the eye.    Use a warm, wet cloth to wipe away crusting of the eyelids in the morning. This is caused by mucus drainage during the night. You may also use saline irrigating solution or artificial tears to rinse away mucus in the eye. Do not put a patch over the eye.    Wash your hands before and after touching the infected eye. This is to prevent spreading the infection to the other eye, and to other people. Do not share your towels or washcloths with others.    You may use acetaminophen or ibuprofen to control pain, unless another medicine was prescribed. (Note: If you have chronic liver or kidney  disease or have ever had a stomach ulcer or gastrointestinal bleeding, talk with your doctor before using these medicines.)    Do not wear contact lenses until your eyes have healed and all symptoms are gone.  Follow-up care  Follow up with your healthcare provider, or as advised.  When to seek medical advice  Call your healthcare provider right away if any of these occur:    Worsening vision    Increasing pain in the eye    Increasing swelling or redness of the eyelid    Redness spreading around the eye    5356-6958 [employer]. [address] [place] [address]. All rights reserved. This information is not intended as a substitute for professional medical care. Always follow your healthcare professional's instructions.              Follow-ups after your visit        Your next 10 appointments already scheduled     Feb 02, 2017 10:00 AM   Office Visit with Ramakrishna Elizondo MD   [facility] ([facility]    [address] [id number] [phone]           Bring a current list of meds and any records pertaining to this visit.  For Physicals, please bring immunization records and any forms needing to be filled out.  Please arrive 10 minutes early to complete paperwork.            Mar 07, 2017  8:45 AM   New Visit with Juju Jenkins MD   [facility]    [address] [facility] [address] [address] [phone]              Who to contact     If you have questions or need follow up information about today's clinic visit or your schedule please contact [facility] directly at [phone].  Normal or non-critical lab and imaging results will be communicated to you by MyChart, letter or phone within 4 business days after the clinic has received the results. If you do not hear from us within 7 days, please contact the clinic through MyChart or phone. If you have a critical or abnormal lab  "result, we will notify you by phone as soon as possible.  Submit refill requests through [email or web address] or call your pharmacy and they will forward the refill request to us. Please allow 3 business days for your refill to be completed.          Additional Information About Your Visit        [employer] Information     [email or web address] gives you secure access to your electronic health record. If you see a primary care provider, you can also send messages to your care team and make appointments. If you have questions, please call your primary care clinic.  If you do not have a primary care provider, please call [phone] and they will assist you.        Care EveryWhere ID     This is your Care EveryWhere ID. This could be used by other organizations to access your [facility] medical records  [id number]        Your Vitals Were     Pulse Temperature Respirations Height BMI (Body Mass Index) Pulse Oximetry    59 96.5  F (35.8  C) (Tympanic) 18 5' 10.25\" (1.784 m) 32.21 kg/m2 98%       Blood Pressure from Last 3 Encounters:   01/23/17 132/70   01/05/17 144/72   11/30/16 138/88    Weight from Last 3 Encounters:   01/23/17 226 lb (102.513 kg)   01/05/17 217 lb (98.431 kg)   11/30/16 217 lb (98.431 kg)              We Performed the Following     Glucose          Today's Medication Changes          These changes are accurate as of: 1/23/17  3:23 PM.  If you have any questions, ask your nurse or doctor.               Start taking these medicines.        Dose/Directions    gentamicin 0.3 % ophthalmic ointment   Commonly known as:  GARAMYCIN   Used for:  Acute conjunctivitis of both eyes, unspecified acute conjunctivitis type   Started by:  Ramakrishna Elizondo MD        Dose:  1 Application   Place 1 Application into both eyes 3 times daily   Quantity:  3.5 g   Refills:  0         Stop taking these medicines if you haven't already. Please contact your care team if you have questions.     KETOROLAC TROMETHAMINE OP   Stopped by:  Ramakrishna Elizondo, " MD                Where to get your medicines      These medications were sent to [id number] - [place], [place] - [address]   [address] , [facility] [address]     Phone:  [phone]    - gentamicin 0.3 % ophthalmic ointment             Primary Care Provider Office Phone # Fax #    Ramakrishna Choudhury MD Mikhail [phone]       [facility] [address]  [facility] [address]        Thank you!     Thank you for choosing [facility]  for your care. Our goal is always to provide you with excellent care. Hearing back from our patients is one way we can continue to improve our services. Please take a few minutes to complete the written survey that you may receive in the mail after your visit with us. Thank you!             Your Updated Medication List - Protect others around you: Learn how to safely use, store and throw away your medicines at www.disposemymeds.org.          This list is accurate as of: 1/23/17  3:23 PM.  Always use your most recent med list.                   Brand Name Dispense Instructions for use    ALEVE PO          aspirin 325 MG tablet      Take 325 mg by mouth every evening       atorvastatin 40 MG tablet    LIPITOR    90 tablet    Take 1 tablet (40 mg) by mouth daily       blood glucose monitoring test strip    ACCU-CHEK COMPACT DRUM    3 Box    Test as directed 3 times daily       calcium carbonate 500 MG tablet    OS- mg [place]. Ca     Take 500 mg by mouth 2 times daily       gentamicin 0.3 % ophthalmic ointment    GARAMYCIN    3.5 g    Place 1 Application into both eyes 3 times daily       GLUCOSAMINE CHONDROITIN Tabs      1 tablet twice daily - pt states there are other vitamins/minerals in tablet       lisinopril 20 MG tablet    PRINIVIL/ZESTRIL    180 tablet    20mg tab bid       metFORMIN 1000 MG tablet    GLUCOPHAGE    180 tablet    Take 1 tablet (1,000 mg) by mouth 2 times daily (with meals)       MULTIVITAMIN PO      take  one tablet by mouth daily       * order for DME     1 Device    Equipment being ordered: one pair of diabetic shoes       rivastigmine 3 MG capsule    EXELON    60 capsule    Take 1 capsule (3 mg) by mouth 2 times daily       sertraline 100 MG tablet    ZOLOFT    90 tablet    Take 1 tablet (100 mg) by mouth daily       * UNKNOWN MED DOSAGE      Steroid Shot in eye every 6 - 8 weeks (Dr. Gould)       VITAMIN B 12 PO      Take 500 mcg by mouth daily       VITAMIN D3 PO      Take 2,000 Units by mouth daily       * Notice:  This list has 2 medication(s) that are the same as other medications prescribed for you. Read the directions carefully, and ask your doctor or other care provider to review them with you.

## 2017-01-23 NOTE — PATIENT INSTRUCTIONS
Conjunctivitis, Bacterial  You have an infection in the membranes covering the white part of the eye. This part of the eye is called the conjunctiva. The infection is called conjunctivitis. The most common symptoms of conjunctivitis include a thick, pus-like discharge from the eye, swollen eyelids, redness, eyelids sticking together upon awakening, and a gritty or scratchy feeling in the eye. Your infection was caused by bacteria. It may be treated with medicine. With treatment, the infection takes about 7 to 10 days to resolve.    Home care    Use prescribed antibiotic eye drops or ointment as directed to treat the infection.    Apply a warm compress (towel soaked in warm water) to the affected eye 3 to 4 times a day. Do this just before applying medicine to the eye.    Use a warm, wet cloth to wipe away crusting of the eyelids in the morning. This is caused by mucus drainage during the night. You may also use saline irrigating solution or artificial tears to rinse away mucus in the eye. Do not put a patch over the eye.    Wash your hands before and after touching the infected eye. This is to prevent spreading the infection to the other eye, and to other people. Do not share your towels or washcloths with others.    You may use acetaminophen or ibuprofen to control pain, unless another medicine was prescribed. (Note: If you have chronic liver or kidney disease or have ever had a stomach ulcer or gastrointestinal bleeding, talk with your doctor before using these medicines.)    Do not wear contact lenses until your eyes have healed and all symptoms are gone.  Follow-up care  Follow up with your healthcare provider, or as advised.  When to seek medical advice  Call your healthcare provider right away if any of these occur:    Worsening vision    Increasing pain in the eye    Increasing swelling or redness of the eyelid    Redness spreading around the eye    7755-2527 [employer]. [address], [place] [address]. All rights reserved. This information is not intended as a substitute for professional medical care. Always follow your healthcare professional's instructions.

## 2017-01-23 NOTE — NURSING NOTE
"Chief Complaint   Patient presents with     Eye Problem       Initial /70 mmHg  Pulse 59  Temp(Src) 96.5  F (35.8  C) (Tympanic)  Resp 18  Ht 5' 10.25\" (1.784 m)  Wt 226 lb (102.513 kg)  BMI 32.21 kg/m2  SpO2 98% Estimated body mass index is 32.21 kg/(m^2) as calculated from the following:    Height as of this encounter: 5' 10.25\" (1.784 m).    Weight as of this encounter: 226 lb (102.513 kg).  BP completed using cuff size: regular  "

## 2017-01-23 NOTE — PROGRESS NOTES
Clinic Care Coordination Contact    RN CC informed patient's spouse Judi about PCP recommendations for blood sugars at home. She states she will do the best she can as even that he does not like to do. She then stated that they have an appointment with the neurologist 3/7/17 Dr. Jenkins in [place]. She states Marie Godinez from [facility] is meeting with her today to discuss respite care options.    Pat states that she needs patient to be seen for red eyes as they are bothersome. RN CC assisted spouse in making an appointment with PCP today at 3pm to be seen.     She states she will update RN CC as plans progress.    Cari Rasheed RN, [facility]  RN Care Coordinator  [facility]  Phone # [phone]  1/23/2017 11:26 AM

## 2017-01-26 NOTE — PROGRESS NOTES
Maik is having severe agitation and paranoid behavior probably related to delirium episode. He is known to have dementia unspecified. Haloperidol low dose prescribed for when necessary use only. Suggested to go ER if symptoms persist or worsen.      Ramakrishna Elizondo MD  [facility]

## 2017-01-26 NOTE — TELEPHONE ENCOUNTER
S-(situation): family called concerned about his behavior.  It is getting worse and they are having a hard time getting him calmed down.    B-(background): Patient has dementia.    A-(assessment): Patient has been having these episodes for the past 6-8 months.  They are increasing and getting more agitated.  Patient did go outside today without his coat.  It took a lot of convincing to get him back in.  Patient has trouble trusting his family. Patient has been confused but this is not normal.  The paranoia and hallucinations is getting worse.  They would like something to help get him calm or agitated.     R-(recommendations): Advised family I would discuss this with the provider.     Janey QUIGLEY RN

## 2017-01-26 NOTE — PROGRESS NOTES
Spouse was notified of RX and instructions below.  Spouse agrees with the plan and understands.    Janey QUIGLEY RN

## 2017-01-26 NOTE — TELEPHONE ENCOUNTER
Patient's wife is calling stating they are having a difficult time with Bill today. He is paranoid, left the house without a coat, doesn't know who his family is. She is wondering what they can do or give him to calm him down.    Cleopatra [facility]

## 2017-01-30 NOTE — PROGRESS NOTES
Clinic Care Coordination Contact  Care Team Conversations    Call placed to spouse to see how pt was doing. Long discussion with spouse regarding her challenges with care taking and that she is having to stay with family due to pt's paranoia, hallucination, and behaviors.  Spouse was in agreement with Sw looking for a [facility] facility for pt to go for medication management/behavior stabilization.  Call was made to [facility] to inquire about an opening for pt.  Spoke with Pari Sanford RN who said she was familiar with the pt as Stephanie Matias, [facility] caregiver consultant had discussed pt before.  Pari felt pt was appropriate for admission per Stephanie's referral and felt pt needed hospitalization for stabilization.  They had an opening that pt could have but only if he got there today and pari faxed admission paperwork needs.  Pari said that pt would need to be medically cleared which would include a CT scan, before admission which would mean an ED visit.      Updated spouse and daughter on needs for admission today.  Daughter asked that her phone number be given to Pari for a call as she did not have a way to write down the number.     Information was faxed to Pari for admission consideration.  SW will remain available to assist as needed and will check the chart tomorrow for ED visit and outcome.  If no ED visit and admit is noted then Sw will outreach.    RN/Cc and PCP were updated on plan.     PHILLIP Leger, Clinic Care Coordinator 1/30/2017   3:29 PM  [phone]

## 2017-01-30 NOTE — ED NOTES
Pt here for medical clearance to be admitted to LTC facility in [place]. This is reported by wife. The patient has no idea why he is here.

## 2017-01-30 NOTE — ED NOTES
Pt her with family. faily members report pt becomes very agitated if condition is discussed in front of him.

## 2017-01-30 NOTE — ED PROVIDER NOTES
History     Chief Complaint   Patient presents with     Dementia     Pt here for medical clearance to be admitted to LTC facility in [place].      HPI  Maik Case is a 71 year old male, past medical history is significant for hypertension, obesity, GERD, type II diabetes, hyperlipidemia, BPH, CVA, BPV, dementia, anxiety, depression, presents to the emergency department apparently for request for medical clearance for him to be admitted to a long-term care facility in [place].  The patient was sent here with family at the direction of arely Sanford at [facility] who informed her that the patient would require medical clearance and a head CT before they would be able to accept him to the care of their facility today by ambulance.  My conversation with the patient was very limited as the daughter states that if we discuss his dementia in front of him that he will become very agitated.  I spoke with her in the hallway about him.  She's had no acute concerns with him recently other than his behavior and agitation at home making very difficult for him to be cared for properly.  They've just recently established care with a Dr. Elizondo in [place] but are unable to get an appointment today, which is apparently required to get the patient into the care facility today, and so were directed to the emergency department.  No communication was made to the emergency department with respect to this patient and the unique subset of social circumstances under which he presents.  The patient states that he has no concerns, he is puzzled as to why he is here, was not aware that he was in the emergency department but was generally agreeable and gave his consent when asked if I could examine him.      Janey Bucio LSW at 1/30/2017  3:17 PM      Status: Signed         Expand All Collapse All    Clinic Care Coordination Contact  Care Team Conversations    Call placed to spouse to see how pt  was doing. Long discussion with spouse regarding her challenges with care taking and that she is having to stay with family due to pt's paranoia, hallucination, and behaviors.  Spouse was in agreement with Sw looking for a [facility] facility for pt to go for medication management/behavior stabilization.  Call was made to [facility] to inquire about an opening for pt.  Spoke with Pari Sanford RN who said she was familiar with the pt as Stephanie Matias, [facility] caregiver consultant had discussed pt before.  Pari felt pt was appropriate for admission per Stephanie's referral and felt pt needed hospitalization for stabilization.  They had an opening that pt could have but only if he got there today and pari faxed admission paperwork needs.  Pari said that pt would need to be medically cleared which would include a CT scan, before admission which would mean an ED visit.      Updated spouse and daughter on needs for admission today.  Daughter asked that her phone number be given to Pari for a call as she did not have a way to write down the number.     Information was faxed to Pari for admission consideration.  SW will remain available to assist as needed and will check the chart tomorrow for ED visit and outcome.  If no ED visit and admit is noted then Sw will outreach.     RN/Cc and PCP were updated on plan.     PHILLIP Leger, Clinic Care Coordinator 2017   3:29 PM  [phone]                                 [place] [facility]    No data filed       Medications at Start of Encounter         Disp Refills Start End     haloperidol (HALDOL) 1 MG tablet 60 tablet 0 2017      Sig - Route: Take 1 tablet (1 mg) by mouth 3 times daily as needed for agitation - Oral     Class: E-Prescribe     Number of times this order has been changed since signin         Order Audit [place]         gentamicin (GARAMYCIN) 0.3 % ophthalmic ointment 3.5 g 0 2017      Sig - Route:  Place 1 Application into both eyes 3 times daily - Both Eyes     Class: E-Prescribe     Number of times this order has been changed since signin         Order Audit [place]         rivastigmine (EXELON) 3 MG capsule 60 capsule 1 2017      Sig - Route: Take 1 capsule (3 mg) by mouth 2 times daily - Oral     Class: E-Prescribe     Number of times this order has been changed since signin         Order Audit [place]         atorvastatin (LIPITOR) 40 MG tablet 90 tablet 0 2017      Sig - Route: Take 1 tablet (40 mg) by mouth daily - Oral     Class: E-Prescribe     Notes to Pharmacy: Needs fasting labs for further refills     Number of times this order has been changed since signin         Order Audit [place]         metFORMIN (GLUCOPHAGE) 1000 MG tablet 180 tablet 3 2016      Sig - Route: Take 1 tablet (1,000 mg) by mouth 2 times daily (with meals) - Oral     Class: E-Prescribe     Number of times this order has been changed since signin         Order Audit [place]         lisinopril (PRINIVIL,ZESTRIL) 20 MG tablet 180 tablet 1 2016      Simg tab bid     Class: E-Prescribe     Number of times this order has been changed since signin         Order Audit [place]         sertraline (ZOLOFT) 100 MG tablet 90 tablet 3 6/15/2016      Sig - Route: Take 1 tablet (100 mg) by mouth daily - Oral     Class: E-Prescribe     Number of times this order has been changed since signin         Order Audit [place]         calcium carbonate (OS- MG [place]. CA) 500 MG tablet         Sig - Route: Take 500 mg by mouth 2 times daily - Oral     Class: Historical     Number of times this order has been changed since signin         Order Audit [place]         Cholecalciferol (VITAMIN D3 PO)         Sig - Route: Take 2,000 Units by mouth daily - Oral     Class: Historical     Number of times this order has been changed since signin         Order Audit [place]         Naproxen Sodium (ALEVE PO)         Sig  - Route: Take 220 mg by mouth 2 times daily (with meals)  - Oral     Class: Historical     Number of times this order has been changed since signing: 3         Order Audit [place]         aspirin 325 MG tablet         Sig - Route: Take 325 mg by mouth every evening - Oral     Class: Historical     Number of times this order has been changed since signin         Order Audit [place]         glucose blood VI test strips (ACCU-CHEK COMPACT DRUM TEST STRIPS) strip 3 Box 3 2014      Sig: Test as directed 3 times daily     Class: E-Prescribe     Number of times this order has been changed since signin         Order Audit [place]         ORDER FOR DME 1 Device 0 10/10/2013      Sig: Equipment being ordered: one pair of diabetic shoes     Number of times this order has been changed since signin         Order Audit [place]         Cyanocobalamin (VITAMIN B 12 PO)         Sig - Route: Take 500 mcg by mouth daily  - Oral     Class: Historical     Number of times this order has been changed since signin         Order Audit [place]         UNKNOWN MED DOSAGE         Sig: Steroid Shot in eye every 6 - 8 weeks (Dr. Gould)     Class: Historical     Number of times this order has been changed since signin         Order Audit [place]         MULTIVITAMIN OR         Sig: take one tablet by mouth daily     Class: Historical     Number of times this order has been changed since signin         Order Audit [place]         GLUCOSAMINE CHONDROITIN OR TABS         Sig - Route: 1 tablet by mouth twice daily - pt states there are other vitamins/minerals in tablet - Oral     Class: Historical     Number of times this order has been changed since signing: 15         Order Audit [place]                        Allergies       Allergies as of 2017  Reviewed On: 2017 By: Guillermina Mack     Allergen Noted Reaction Type Reactions     Aricept [Donepezil] 2016    Blisters     On face     Penicillins 2005    Swelling      Percocet [Oxycodone-Acetaminophen] 06/20/2013   Allergy Or Hypersensitivity Itching     Vicodin [Hydrocodone-Acetaminophen] 04/28/2014    Itching       Encounter Status      Closed By: Janey Bucio LSW on 1/30/17 at 3:29 PM       Created by      Janey Bucio LSW on 01/30/2017 03:14 PM       Maik Case   1/30/2017   Care Coordination   MRN:  [id number]    Description: 71 year old male   Provider: Ramakrishna Elizondo MD   Department:  Care Coordination         All Flowsheet Templates (all recorded)      Patient Care Plan       Encounter-Level Documents:      There are no encounter-level documents.       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 04/11/2011     Benign localized hyperplasia of prostate with urinary obstruction and other lower urinary tract symptoms (LUTS)(600.21) 06/15/2011     Status post lumbar surgery 10/02/2013     H/O: CVA (cerebrovascular accident) 10/02/2013     Lupus anticoagulant positive 12/05/2013     BPV (benign positional vertigo) 09/08/2014     Dementia without behavioral disturbance, unspecified dementia type 05/05/2016     Anxiety 05/05/2016     Depression, unspecified depression type 01/09/2017     Resolved Ambulatory Problems     Diagnosis Date Noted     Diabetes mellitus, type 2 (H) 08/03/2005     Mixed hyperlipidemia 10/27/2006     Cataract 08/17/2009     Type 2 diabetes, HbA1c goal < 7% (H) 10/31/2010     Sciatica 06/22/2013     Back pain 06/26/2013     Low back pain 06/26/2013     Past Medical History   Diagnosis Date     Type II or  unspecified type diabetes mellitus without mention of complication, not stated as uncontrolled      Background retinopathy, unspecified      HTN      Past Surgical History   Procedure Laterality Date     Surgical history of -        s/p (R) Knee     Surgical history of -        s/p Hernia     Surgical history of -        s/p Tonsillectomy as a child     Surgical history of -   2003     s/p (R) Knee     Social History     Social History     Marital Status:      Spouse Name: N/A     Number of Children: N/A     Years of Education: N/A     Occupational History     Not on file.     Social History Main Topics     Smoking status: Former Smoker     Quit date: 1980     Smokeless tobacco: Never Used     Alcohol Use: Yes      Comment: yearly one beer four times a year     Drug Use: No     Sexual Activity:     Partners: Female     Other Topics Concern      Service No     Blood Transfusions No     Caffeine Concern Yes     2-3 cans of diet coke     Occupational Exposure No     retired     Hobby Hazards No     Sleep Concern No     Stress Concern No     Weight Concern No     Special Diet Yes     diabetic     Back Care No     Exercise Yes     Seat Belt Yes     Self-Exams Yes     blood sugars     Social History Narrative     Family History   Problem Relation Age of Onset     Alzheimer Disease Mother      on med     C.A.D. Father       unknown, last seen him age 18     Eye Disorder Brother      Retinitis pigmentosis, cataracts     Hypertension Daughter      Current Facility-Administered Medications   Medication     haloperidol (HALDOL) tablet 5 mg     Current Outpatient Prescriptions   Medication     haloperidol (HALDOL) 1 MG tablet     gentamicin (GARAMYCIN) 0.3 % ophthalmic ointment     rivastigmine (EXELON) 3 MG capsule     atorvastatin (LIPITOR) 40 MG tablet     metFORMIN (GLUCOPHAGE) 1000 MG tablet     lisinopril (PRINIVIL,ZESTRIL) 20 MG tablet     sertraline (ZOLOFT) 100 MG tablet     calcium  carbonate (OS- MG [place]. CA) 500 MG tablet     Cholecalciferol (VITAMIN D3 PO)     Naproxen Sodium (ALEVE PO)     aspirin 325 MG tablet     Cyanocobalamin (VITAMIN B 12 PO)     UNKNOWN MED DOSAGE     MULTIVITAMIN OR     GLUCOSAMINE CHONDROITIN OR TABS     glucose blood VI test strips (ACCU-CHEK COMPACT DRUM TEST STRIPS) strip     ORDER FOR DME        Allergies   Allergen Reactions     Aricept [Donepezil] Blisters     On face     Penicillins Swelling     Percocet [Oxycodone-Acetaminophen] Itching     Vicodin [Hydrocodone-Acetaminophen] Itching       I have reviewed the Medications, Allergies, Past Medical and Surgical History, and Social History in the Epic system.    Review of Systems   Constitutional: Negative.    HENT: Negative.    Eyes: Negative.    Respiratory: Negative.    Cardiovascular: Negative.    Gastrointestinal: Negative.    Endocrine: Negative.    Genitourinary: Negative.    Musculoskeletal: Negative.    Skin: Negative.    Allergic/Immunologic: Negative.    Neurological: Negative.    Hematological: Negative.    Psychiatric/Behavioral: Negative.        Physical Exam   BP: 176/77 mmHg  Pulse: 71  Temp: 98.9  F (37.2  C)  Resp: 20  SpO2: 96 %  Physical Exam   Constitutional: He appears well-developed and well-nourished.   HENT:   Head: Normocephalic and atraumatic.   Right Ear: External ear normal.   Left Ear: External ear normal.   Nose: Nose normal.   Mouth/Throat: Oropharynx is clear and moist.   Eyes: Conjunctivae and EOM are normal. Pupils are equal, round, and reactive to light.   Neck: Normal range of motion. Neck supple.   Cardiovascular: Normal rate, regular rhythm, normal heart sounds and intact distal pulses.    Pulmonary/Chest: Effort normal and breath sounds normal.   Abdominal: Soft. Bowel sounds are normal.   Musculoskeletal: Normal range of motion.   Neurological: He is alert.   Alert, pleasant, oriented ×1 only.   Skin: Skin is warm and dry.   Psychiatric: He has a normal mood and  affect. His behavior is normal.   Nursing note and vitals reviewed.      ED Course   Procedures           6:16 PM  I explained to the patient's daughter that this is not normally a situation that would be addressed in the emergency department given chronic conditions and in no acute changes.  Typically this patient would be seen in the clinic by his primary care provider for documentation of exam and any required blood work, EKG CT etc.  She explained that they were unable to get an appointment today with his primary care provider and feel quite stressed in order to be able to get him into a care facility today.  They were directed to the emergency department for reasons discussed above so that the patient can be admitted to a long-term care facility.  The emergency department is in [place] at the time of presentation, I informed the daughter that there could be significant time delay due to the higher acuity of multiple other patients.  She was very upset with this and stated that they had already been waiting for 2-1/2 hours to be seen.  I explained that the situation is beyond my control and that the patient does not require acute stabilization and would seen and treated appropriately and as quickly as possible based on triage criteria.    6:33 PM  I called the [facility] care [facility] at [phone] and unfortunately the patient directing the patient and family(Pari) had already gone home; I spoke with Kristina who stated that apparently the patient's wife has been having increasing difficulties with him with some volatility related to his paranoia and dementia.  She is afraid of the patient.  As they were unable to get the patient in for appropriate admission blood work, CT scan with the patient's new primary care provider as identified in the HPI they were sent to the emergency department at the direction of the Senior care unit.           EKG Interpretation:      Interpreted by Cedric Meek  [place]  :Time reviewed: 18:43  Symptoms at time of EKG: none   Rhythm: normal sinus   Rate: normal  Axis: normal  Ectopy: none  Conduction: normal  ST Segments/ T Waves: No ST-T wave changes  Q Waves: none  Comparison to prior: Unchanged from 9/12/2011    Clinical Impression: normal EKG; sinus bradycardia 57 bpm    Results for orders placed or performed during the hospital encounter of 01/30/17   CT Head w/o Contrast    Narrative    CT OF THE HEAD WITHOUT CONTRAST 1/30/2017 6:56 PM     COMPARISON: Brain MR 2/28/2014.    HISTORY: Dementia.    TECHNIQUE: 5 mm thick axial CT images of the head were acquired  without IV contrast material.    FINDINGS: There is moderate diffuse cerebral volume loss. There are  subtle patchy areas of decreased density in the cerebral white matter  bilaterally that are consistent with sequela of chronic small vessel  ischemic disease.    The ventricles and basal cisterns are within normal limits in  configuration given the degree of cerebral volume loss.  There is no  midline shift. There are no extra-axial fluid collections.    No intracranial hemorrhage, mass or recent infarct.    The visualized paranasal sinuses are well-aerated. There is no  mastoiditis. There are no fractures of the visualized bones.      Impression    IMPRESSION: Diffuse cerebral volume loss and cerebral white matter  changes consistent with chronic small vessel ischemic disease. No  evidence for acute intracranial pathology.    Radiation dose for this scan was reduced using automated exposure  control, adjustment of the mA and/or kV according to patient size, or  iterative reconstruction technique.      SURYA MONTE MD           Critical Care time:  none               Labs Ordered and Resulted from Time of ED Arrival Up to the Time of Departure from the ED   CBC WITH PLATELETS DIFFERENTIAL - Abnormal; Notable for the following:     RBC Count 3.99 (*)     Hemoglobin 12.0 (*)     Hematocrit 34.9 (*)     All other  components within normal limits   INR - Abnormal; Notable for the following:     INR 1.28 (*)     All other components within normal limits   COMPREHENSIVE METABOLIC PANEL - Abnormal; Notable for the following:     Glucose 150 (*)     Protein Total 6.3 (*)     All other components within normal limits   LIPASE - Abnormal; Notable for the following:     Lipase 889 (*)     All other components within normal limits   UA MACROSCOPIC WITH REFLEX TO MICRO AND CULTURE - Abnormal; Notable for the following:     Ketones Urine 5 (*)     Protein Albumin Urine 10 (*)     Mucous Urine Present (*)     All other components within normal limits   PARTIAL THROMBOPLASTIN TIME   TSH WITH FREE T4 REFLEX   8:30 PM  The patient's family is concerned that the patient seems to be getting more agitated and he would like to have something for sedation.  Patient received 5 mg of oral Haldol earlier and with the escalation and agitation received 5 mg of Zyprexa IM.  We are awaiting acceptance for transfer of the patient to  at the above-mentioned facility.  All labs and CT reports have been sent.  Patient's family are aware of awaiting acceptance from the receiving facility.  10:04 PM  I was contacted by a nurse at the above-mentioned facility, Shikha, who reviewed the patient's labs and had questions regarding his lipase which is elevated.  I did this as part of his screening labs.  He has no symptoms that would relate to an elevated lipase at the present time.  He is eating and drinking normally.  No recent change in bowel habits.  Vital stable.  This may relate to one or more of his medications, at this point I do not know.  I would repeat his lipase and one or 2 weeks and if remains elevated I would work him up further.  This should not interfere with his acceptability medically for admission to a long-term care facility.  10:29 PM  I was informed by the nurse Shikha that I spoke to earlier that Dr. Garcia is accepting the patient for  admission at their facility.  They requested that the patient come by ambulance.  I reviewed my conversations as well as all the patient's lab diagnostics and evaluation with his daughter.  I answered her questions.  I specifically reviewed the elevated lipase which will need to be revisited the next 1-2 weeks.  He may require further workup for this and they understand that.    Assessments & Plan (with Medical Decision Making)   71-year-old male past medical history reviewed above, presents to the emergency department to facilitate medical exam to enable admission to a long-term care facility.  The patient had no acute concerns upon presentation.  Considerable effort was placed in obtaining collateral history and it appears the patient is becoming increasingly more agitated and potentially aggressive and dangerous at home with his wife.  They were directed to this emergency department as they were not able to get a primary care visit in a timely fashion.  As noted the patient is asymptomatic with respect to acute concerns on exam and was cooperative for the exam.  At the request of the receiving facility we obtained lab diagnostics and CT imaging of the patient's head which are resulted and reviewed as above.  No significant abnormalities other than the elevated lipase are found.  As the patient is asymptomatic and eating and drinking without difficulty do not think that the lipase elevation relates to acute pancreatitis ,  I suspect it may be medication related but I cannot prove this at the present time.  The patient is not medically unstable, I would follow-up his lipase in 1-2 weeks and if it remains elevated it will require further evaluation.  I completed the requested paperwork, ongoing conversation with the receiving facility and the patient was transported by ambulance to that facility this evening after stepwise sedation to avoid escalation of his potential agitation and aggression.  The family was kept  informed of all delays.    Disclaimer: This note consists of symbols derived from keyboarding, dictation and/or voice recognition software. As a result, there may be errors in the script that have gone undetected. Please consider this when interpreting information found in this chart.      I have reviewed the nursing notes.    I have reviewed the findings, diagnosis, plan and need for follow up with the patient.    New Prescriptions    No medications on file       Final diagnoses:   Dementia with behavioral disturbance, unspecified dementia type   Elevated lipase       1/30/2017   [facility] EMERGENCY DEPARTMENT      Cedric Buck MD  01/31/17 0136

## 2017-01-30 NOTE — ED NOTES
Spoke with nurse from [facility] stating that hte patient needs a head CT in order to qualify for admission. The nurse also stated that he will need ambulance transportation there.

## 2017-01-31 NOTE — ED NOTES
Pt asleep in bed. Pt's family anxious for transfer but very cooperative. Family members have been continueally updated on time frame. EMS was delayed for 911 call. Family updated.

## 2017-01-31 NOTE — PROGRESS NOTES
Clinic Care Coordination Contact  [facility]/Voicemail    Referral Source: PCP  Clinical Data: Care Coordinator Outreach to check on transition to [facility]  Outreach attempted x 1.  Left message on voicemail with call back information and requested return call.  Plan: Care Coordinator will try to reach patient again in 3-5 business days.  PHILLIP Leger, Clinic Care Coordinator 1/31/2017   9:42 AM  [phone]

## 2017-01-31 NOTE — ED NOTES
Family members informed writer that his mood is beginning to change, they fear that he will become aggressive

## 2017-01-31 NOTE — PROGRESS NOTES
Clinic Care Coordination Contact  Care Team Conversations    Call placed to daughter of pt for check in.  She said that spouse has called up to [facility] and that he is angry.  Discussed that he is at the best place to get the care and medication adjustment that he needs. Daughter agreed and they will stay in contact with the facility for updates and when they can visit.     Will follow up in about one week.     PHILLIP Leger, Clinic Care Coordinator 1/31/2017   1:50 PM  [phone]

## 2017-02-03 NOTE — PROGRESS NOTES
"Clinic Care Coordination Contact  Care Team Conversations    Call placed to spouse to check on progress.  Per spouse pt is exhibiting less fear/paranoia with medications and setting while being at [facility] sergio psych unit.  Spouse said the nurse up there commented on pt being more relaxed and having conversations with others there, although nurse said she was not sure they knew what each were talking about.  Spouse sounded relieved that pt was feeling better and not as paranoid.      Recommendations from the sergio psych unit is that pt does not return home and moves into a facility.  Spouse is working with the [facility] to get pt signed up for MA with anticipation to move into [facility] in [place].  There may be a period of 2-3 weeks between placements and discussed options.  Discussed that returning home may not be the best option as the transition to [facility] may be harder, spouse agreed this may not be the best plan.  Spouse is planning to call a couple of options for temporary placement between [facility] and [facility].     Spouse said that the ED visit was long and pt needed Haldol a couple of times to help calm him for the tests and stay.  She noted that the ED Nurses were \"fabulous\" and that it was like \"rehman's law was occuring at every event\".  She said it took 5 tries to fax the needed information over to [facility] for hospital stay and then several hours for the ambulance to be available to transport.  Hospital said it was not safe for family to transport due to his unpredictability.       Discussed at length that pt is were he needs to be to get proper medication as well as his future living environment being out of home.  Spouse said she knows he is happier and safer in a facility and \"truly feel in my hear that is where he needs to be\".      Plan:  Spouse to check on temporary placement options between facilities and to work with the [facility] on paperwork " completion.  SW to update RN and calls out to be made next week.    PHILLIP Leger, Clinic Care Coordinator 2/3/2017   10:17 AM  [phone]

## 2017-02-08 NOTE — PROGRESS NOTES
"Clinic Care Coordination Contact  Care Team Conversations    Spouse said that they found out that pt is at end stage alzheimer's.  Per the psychiatrist the MRI shows that his \"brain is ravaged with the disease\".  Things had been going well at the [facility] psych and now he is having more challenges/behaviors.  She is told that there is no way he can come home and they are working on medications.  They ware looking at possibly next week for a release.      With the additional medications to manage his behaviors he is loosing the ability to walk and do other tasks.      Secured a bed at [facility] [place] if behaviors manageable.  Spouse will continue to work with the facility to find a safe place for pt.  She has completed the application for MA and turned this in.      RN and Sw will continue to reach out and support pt and spouse.     PHILLIP Leger, Clinic Care Coordinator 2/8/2017   1:17 PM  [phone]          "

## 2017-02-10 NOTE — PROGRESS NOTES
"Clinic Care Coordination Contact  Care Team Conversations    RN CC spoke with patient's wife Judi, she states they are in the process of getting Bill's behaviors under control so that they then can discharge him to [facility]. She states if they don't get his behaviors under control she states she does not have a plan \"B, C, D\" RN CC explained to her that its not for her to worry about, there is a  up there that will work on plan \"B, C, D\" if needed along with the [facility] services. RN CC re-assured her that patient is where he needs to be and needs to discharge to a safe environment. She verbalized that is what she needed to hear. She states she has applied for Medical Assistance and is waiting to hear if she qualifies. She denies having any questions or concerns at this time for RN CC. She states she has to go as she was getting a call from [facility].    Plan:  RN CC to check in one week.    Cari Rasheed RN, [facility]  RN Care Coordinator  [facility], [facility]  Phone # [phone]  2/10/2017 11:00 AM        "

## 2017-02-15 NOTE — PROGRESS NOTES
"Clinic Care Coordination Contact  Care Team Conversations    Call to spouse of pt.  She said pt fell out of chair while up at the [facility] and that he did not get hurt.  Pt has been up there 2 weeks now and she is concerned as her insurance will only cover another 2 weeks.  She has a call to Noy and is waiting for a call back for a prognoses and plan.      She said that due to the drug changes and even though the Risperidone is at a small dose that med along with others have \"hastened his inabilities\" per spouse.  He is no longer walking and needs a lift for transfers, is in a wheel/sergio chair, incontinent, and needing help with eating.  She did say he is pleasant, smiling and appearing relaxed.  He does have what the nurses tell her are hallucination and she thinks they are more memories of fishing and golfing.  He is talking but not making sense. He is not having behaviors any longer. She feels it is a trade off that is good yet is sad to see him not able to do what he use to do.     spouse was not sure about [facility] would still be willing to taking him with lift and needing help eating.  Discussed that the care center should be able to meet his needs and accept him.  Did discuss her calling there with update on his condition and to verify that they can still accept him to calm her mind. She agreed that she will do this.  Encouraged her to talk with Noy from [facility] to see if she has been in contact with [facility] as well for exchange of information.  She will wait for Noy to call and if no return call will try her again later today.     Spouse appreciative of the calls from RN and Luis A to help her with this process.      Sw and RN will continue with weekly outreaches for support.     PHILLIP Leger, Clinic Care Coordinator 2/15/2017   10:54 AM  [phone]      "

## 2017-02-15 NOTE — PROGRESS NOTES
Clinic Care Coordination Contact  Care Team Conversations    Spouse called and left a message stating that Noy called her and [facility] is accepting care for pt and looks like discharge will be Tuesday.  [facility] will be contacting [facility] for equipment and incontinent products for pt.  Spouse sounded relieved and said she was very happy with the plan.     Sw will follow up with spouse next week.     PHILLIP Leger, Clinic Care Coordinator 2/15/2017   11:38 AM  [phone]

## 2017-02-17 NOTE — PROGRESS NOTES
"Clinic Care Coordination Contact  Care Team Conversations    RN CC spoke with patient's spouse to see how she is doing. She was happy and laughing on the phone. She states Bill has been accepted at [facility], will most likely be Wednesday when he is moved there. She states they are working on getting him a hospital bed, \"Broda\" chair, bed alarm, incontinence supplies. She states she is going to [place] [facility] to see how to go about the supplies. RN CC told her to have them send PCP the orders to sign, she verbalized understanding. She states Ryan from [facility] told her he was approved for Medical Assistance 1/17/17.  She stated how much she appreciates the follow up calls from RN & ERICK CAPPS and is feeling very supported.     Patient states she is going to her great grand-daughter's recital this weekend.    Plan:  ERICK CAPPS is planning to outreach early next week.  RN CC will reach out end of next week.    Cari Rasheed RN, [facility]  RN Care Coordinator  [facility], [facility]  Phone # [phone]  2/17/2017 2:48 PM  "

## 2017-02-20 NOTE — PROGRESS NOTES
Clinic Care Coordination Contact  Care Team Conversations    Per spouse pt is having some anxiety but doing well, joking with people, voiced need to us the bathroom.  One RN has had him walking a little which has not been happening much lately.  Spouse is looking at all of these things as positives.      Pt will be moving Tuesday or Wednesday to [facility].  [employer] will have all the equipment moved in today that is needed.  She is asking their grand daughter to make a picture book for him to have for looking through and memories.      Spouse sounded positive with the plan and discussed that she did keep him home as long as she could and even surpassed that at some level.  She was encouraged to call if she has any questions or concerns.  She is aware that having him at home is not an option with the needs he now has related to changes in disease and medications.      Sw will call in about one week.  RN will call later this week.     PHILLIP Leger, Clinic Care Coordinator 2/20/2017   10:28 AM  [phone]

## 2017-02-22 NOTE — TELEPHONE ENCOUNTER
Reason for Call:  Other call back    Detailed comments: She wants to know how to get Hospice eval set up for her dad.  Please advise    Phone Number Patient can be reached at: Other phone number:  Elizabeth Foster Daughter [phone]    Best Time: any    Can we leave a detailed message on this number? YES    Call taken on 2/22/2017 at 1:53 PM by Naida Todd

## 2017-02-22 NOTE — TELEPHONE ENCOUNTER
Spoke with dtr who reports significant cognitive and functional decline since has been hospitalized at [facility].  Plan for discharge either Fri 2/24 or Mon 2/27, admit to [facility].  Requesting hospice referral for admission after admits to .  RN discussed with Dr. Elizondo who is in agreement. vorb.  Referral place with dtr as .   Forwarded to RN and ERICK CAPPS as MANOJ Kapadia RN

## 2017-02-24 NOTE — PROGRESS NOTES
Clinic Care Coordination Contact  [facility]/Voicemail    Referral Source: PCP    Clinical Data: Care Coordinator Outreach, check in.    Outreach attempted x 1 wife Pat's VM.  Left message on voicemail with call back information and requested return call.    Plan: Care Coordinator will try to reach patient again in 1-2 business days.    Cari Rasheed RN, [facility]  RN Care Coordinator  [facility], [facility]  Phone # [phone]  2/24/2017 1:40 PM

## 2017-02-28 PROBLEM — F02.80 ALZHEIMER'S DEMENTIA (H): Status: ACTIVE | Noted: 2017-01-01

## 2017-02-28 PROBLEM — G30.9 ALZHEIMER'S DEMENTIA (H): Status: ACTIVE | Noted: 2017-01-01

## 2017-02-28 NOTE — TELEPHONE ENCOUNTER
Hospice Update-No Response Required  ADMISSION SUMMARY NOTE BY: Christina Camejo RN  PRIMARY PROVIDER: Ramakrishna Elizondo  PRIMARY DX: Dementia with development of behavioral disturbances   CO-MORBIDITIES: diabetes type 2, anxiety, major depressive disorder, GERD, hyperlipidemia, hypertension, systemic lupus erythematosus, osteoporosis, spinal stenosis, TIA without residual effects.  POLST: DNR  CURRENT STATUS/LCD'S FOR CERT: KPS 30, FAST 7c. Pt now dependent for all ADLs since 1/2017, lived with wife in their home and now admitted to memory care unit following hospitalization.  Incontinent of bowel and bladder.  Limited intelligible words and no consistent meaningful verbal communication. Inability to maintain sufficient intake with weight loss, pt's weight 1 year ago 300lbs, in January 2017 225lbs and current weight 207lbs.  Recent hospitalization in geriatric psych due to increasing behavioral disturbances, such as grabbing the wheel of car while wife driving, accusing wife of being intruder in their home, and leaving home wandering unattended.  MED CHANGES: Discontinued vitamins and supplements.  Added ibuprofen, senna, miralax.  PATIENT GOALS:  Pt unable to make goals, maintain safety.  FAMILY GOALS:  To keep pt as happy as he can be and keep him comfortable.  SMART GOAL:  No falls.  PLAN FOR NEXT 2 WEEKS:  SNV 2x/wk, next visit Wednesday for symptom management, med changes, and EOL education/support. HHA 2x/wk for 1 hour for skin care, ADL assistance.  MSW 1-3x/mo for EOL education/support, community resources.  PT/OT eval for safe transfers/adaptive equipment needs.  Massage per request.  No to  and volunteers.

## 2017-03-02 PROBLEM — Z51.5 HOSPICE CARE PATIENT: Status: ACTIVE | Noted: 2017-01-01

## 2017-03-02 NOTE — PROGRESS NOTES
Clinic Care Coordination Contact  [facility]/Voicemail       Clinical Data: Care Coordinator Outreach  Outreach attempted x 1(from Sw, and also 1 from RN).  Busy and unable to leave a message on voicemail with call back information and requested return call.  Plan:  Care Coordinator will try to reach patient again in 3-5 business days.  PHILLIP Leger, Clinic Care Coordinator 3/2/2017   9:14 AM  [phone]

## 2017-03-03 NOTE — PROGRESS NOTES
Clinic Care Coordination Contact  Care Team Conversations    Clinic Care Coordination Contact  Care Team Conversations    Patient's spouse called RN CC back. She states patient was moved to [facility] 2/27/17. She states the first day was bad because they let him nap and then he was up in the night. She states that as long as his behaviors are stable then he can remain there but if they get out of control he will have to go else wear. She states currently he is needing 1:1 care as he is unable to walk or feed self. She states he now is in hospice care as well.     She then talked at length about how they do not provide anything there at [facility]. She states they had to provide a list of items: Bed, dresser, trash bags, toiletries, paper towels, toilet paper, blankets, wash cloths, hand towels, bedding, if he wants pop or something other than coffee, water, juice, chux. She states they are currently paying $6400/month and can not believe that they are not providing anything. She states the staff there are great so far. She states that the Medical Assistance is still pending. RN CC suggested that when Ryan from the [facility] calls her to tell her about this situation to see what she has to say, she verbalized she would do so.     She also states that patient has been having some back pain and has history of back surgery, she doesn't know if she should talk to PCP or hospice about this, RN CC told her to talk with hospice, she verbalized understanding.    RN CC also gave her information regarding [facility] in [place] to call as well as they are locked facility, she verbalized she would call to get information so she has a back up.  She states otherwise she is doing ok. She states she is getting life under control.    Plan:  Patient will continue to follow treatment plan as directed and follow up with PCP with concerns ongoing.   RN CC to outreach next week as well.    Cari Rasheed RN,  [facility]  RN Care Coordinator  [facility]  Phone # [phone]  3/3/2017 2:42 PM

## 2017-03-03 NOTE — PROGRESS NOTES
Clinic Care Coordination Contact  [facility]/Voicemail    Referral Source: PCP    Clinical Data: Care Coordinator Outreach, check in, discharged from [facility] to [facility] with hospice.    Outreach attempted x 2 for spouse Pat.  Left message on voicemail with call back information and requested return call.    Plan: Care Coordinator will outreach again in one week.    Cari Rasheed RN, [facility]  RN Care Coordinator  [facility], [facility]  Phone # [phone]  3/3/2017 12:06 PM

## 2017-03-03 NOTE — PROGRESS NOTES
S:  Patient is a 71 year old male admitted to hospice with a diagnosis of Dementia, suspect Alzheimer's type versus mixed etiology after a recent admission to geriatric psychiatry.  Prior to his admission, he required a brief stabilization in the emergency department and subsequent transfer for inpatient evaluation from is home.  During his hospital stay, Depakote 250 mg 3 times daily with as needed gabapentin 50 mg for anxiety, the addition of the duloxetine 30 mg daily, and risperidone 1 mg 4 times daily was required to achieve adequate stabilization of the patient's paranoia, agitation, and delirium.  Patient continues to wear an Exelon patch at 4.6mg/24hours.  Since discharge from geriatric psychiatry, the patient has been transferred to higher level of care from his private residence into an assisted living memory care unit.  Patient has suffered one fall since arrival, his speech is soft and tangential and thought processes are nonlinear. He still has some degree of impulsivity with trying to get up and ambulate without assistance and initially had difficulty with sleeping however once medications were reinitiated in a fashion similar to his inpatient stay, he slept, but appears with ongoing somnolence now.  No acute fevers, or signs of acute distress or infectious processes.  A medical director request was placed for symptom assessment, management, and for fine tuning of his current medication regiment.    O: Somnolent appearing, no acute distress.  Dry oropharynx.  Neck supple.  Lungs clear.  Cardiac vascular regular.  Abdomen nontender.  Lower extremities with trace edema.  Speech tangenital and confused, soft, and patient only following one-step commands.  Ambulation with assistance to and a wheelchair shows a shuffling gait, constant redirection encouragement required especially with sitting.  Patient had difficulty bringing glass to mouth to drink without assistance.  Staff did note him able to manage  finger foods but not utensils.    A/P: 71-year-old male with diagnosis of Alzheimer's dementia versus mixed type with associated behaviors with the new medication regiment as outlined above.  With exam findings showing evidence of sedation and with concerns for possible fall risk due to his impulsivity and somnolence.  Would consider weaning Exelon patch due to questionable efficacy with advanced dementia.  Would also continue using atypical antipsychotics but perhaps a change from risperidone to Seroquel will adequately manage his anxiety, impulsivity, paranoia, and behaviors without the same level of sedation.  We'll also continue to encourage staff on other methods to redirect patient and to keep him safe and minimize fall risk.

## 2017-03-08 NOTE — PROGRESS NOTES
Clinic Care Coordination Contact  Care Team Conversations    Pt is not assisting with transfers at all and is a total lift at the current time.  Spouse is working with facility for appropriate care for pt.  He is having swelling in his legs and they are bending his leg which spouse feels is causing pain.  She said that they have to get more help to move him and keep saying they have get help or going to move him soon so they wait.  Encouraged her to continue to advocate for his care.  Pt had been in a broda chair and is in a regular wheel chair.  Encouraged her to ask why he was not in the broda chair.      Discussed the importance of re-positioning pt to prevent pressure sores and questions to ask.  Spouse did not appear to get the need for more frequent repositioning until SW asked her how often she has repositioned herself in the 15 minutes we talked.  Shifted weight, leaned in a different direction, moved a leg, etc.  She then appeared to recognize how often she was repositioning herself.  Discussed that with his disease and nutrition it is very important to have regular repositioning.     Spouse has plans to be at the facility to meet with hospice nurse to ask questions.     Sw will call in about one week.  RN continues to also be involved. Spouse reminded she does not have to wait for our calls if she has questions, she can call any time.    PHILLIP Leger, Clinic Care Coordinator 3/8/2017   1:45 PM  [phone]

## 2017-03-10 NOTE — PROGRESS NOTES
Clinic Care Coordination Contact  [facility]/Voicemail    Referral Source: PCP    Clinical Data: Care Coordinator Outreach, follow up    Outreach attempted x 1 to patient's wife Pat.  Left message on voicemail with call back information and requested return call.    Plan: Care Coordinator will outreach to in one week.    Cari Rasheed RN, [facility]  RN Care Coordinator  [facility], [facility]  Phone # [phone]  3/10/2017 9:09 AM

## 2017-03-10 NOTE — PROGRESS NOTES
"Clinic Care Coordination Contact  Care Team Conversations    Patient's spouse Judi called DILIP CAPPS back. She states she talked to hospice nurse Jasmine yesterday about her concerns about patient sitting too long in his wheelchair and then his right leg swells and becomes painful. She also talked to Jasmine about how she feels that patient is too much work for the staff and concerned he is not getting the right care. Jasmine verbalized to her that she would put in a order to have someone come in daily from hospice to help out. She also talked about Jasmine looking at getting patient the Broda chair again as the regular wheelchair is painful for him. She states Jasmine and Dr. Roosevelt Solis mentioned that it might be Lewy Bodies dementia. She states they gave her information on it but has not read up on it yet. She also asked RN CC how long does a person last in end stage, RN CC told her everyone is different so hard to say a time frame, she verbalized understanding. RN CC did tell her to focus more on the quality of care vs quantity for him. RN CC suggested giving him his favorite foods, drinks, music, books, photos, lotions with favorite scents. She states he loves butterscotch pudding, I told her give it to him. She then asked \"should we worry about his blood sugars?\" RN CC told her no, just give him what he wants to make him happy. She verbalized understanding.    During our 30 minute conversation spouse Judi was crying and very upset. She kept saying she wished she had kept him at home. RN CC told her she would not be able to care for him as he is too heavy of cares for her and most likely end up getting hurt, she verbalized understanding. She states her biggest concern is keeping him comfortable and pain free. RN CC told her that is what hospice is there for and encouraged her to communicate her concerns and needs to them, she verbalized understanding.    Plan:  RN CC to f/u with hospice regarding her concerns.  RN CC to " f/u in one week for check in.    Cari Rasheed RN, [facility]  RN Care Coordinator  [facility]  Phone # [phone]  3/10/2017 10:36 AM

## 2017-03-10 NOTE — PROGRESS NOTES
Clinic Care Coordination Contact  Care Team Conversations    RN CC left a VM for [facility] Hospice Team Lead with concerns that Pat had for spouse and asking for update on her requests for daily hospice staff and Broda chair.    Cari Rasheed RN, [facility]  RN Care Coordinator  [facility]  Phone # [phone]  3/10/2017 10:43 AM

## 2017-03-14 NOTE — PROGRESS NOTES
Clinic Care Coordination Contact  Care Team Conversations    Talked with spouse and daughter.  Pt is failing quickly.  He has not eaten since Friday and is having trouble swallowing so not drinking much.  His breathing is shallow and he is showing signs of pain before he is able to get more pain medications.  Hospice nurse is going to be out there Today at 11 and family will be asking for increased pain control.      Spouse and daughter question about pt's admission to the [facility] and that causing this decline and was it really necessary.  Discussed the behavioral challenges they were having and that if he was not appropriate then they would not have accepted him.  Reminded them of the struggles they were having before the admit and that with the med changes it may be allowing the process to progress to the next stage instead of pt fighting.  Daughter agrees pt may have been fighting this next stage.      Reinforced trying to not focus on the should haves, could haves and focus on what was best for the pt and spouse.      Rn will follow up later this week.  SW will follow up next week if pt has not passed.     PHILLIP Leger, Clinic Care Coordinator 3/14/2017   10:56 AM  [phone]

## 2017-03-16 NOTE — PROGRESS NOTES
Clinic Care Coordination Contact  Care Team Conversations    RN CC outreached to Tracee at [facility] asking for a update on patient, she states he is actively dying and is on [facility] with comfort cares.    Cari Rasheed RN, [facility]  RN Care Coordinator  [facility]  Phone # [phone]  3/16/2017 8:12 AM

## 2017-03-17 ENCOUNTER — CARE COORDINATION (OUTPATIENT)
Dept: CARE COORDINATION | Facility: CLINIC | Age: 72
End: 2017-03-17

## 2017-03-17 NOTE — PROGRESS NOTES
Clinic Care Coordination Contact  Care Team Conversations    RN CC spoke with Ryan from [facility] as she was at [facility], she said patient passed away yesterday 3/16/17 at 2:06pm at [facility].     She states patient's spouse is really having a difficult time.    RN CC notified PCP, care team,  CC.    Cari Rasheed RN, [facility]  RN Care Coordinator  [facility]  Phone # [phone]  3/17/2017 8:41 AM

## 2018-02-02 NOTE — TELEPHONE ENCOUNTER
Mr Case was admitted to Hospice yesterday with a diagnosis of dementia with behavioral. This is not a diagnosis that Medicare accepts as primary. Can use as secondary. Please forward a referral via [employer] with primary diagnosis of Alzhheimer,s Dementia. Thank you for your assistance. Maura Rouse RN Hospice .    Need for prophylactic measure
